# Patient Record
Sex: FEMALE | Race: BLACK OR AFRICAN AMERICAN | Employment: UNEMPLOYED | URBAN - METROPOLITAN AREA
[De-identification: names, ages, dates, MRNs, and addresses within clinical notes are randomized per-mention and may not be internally consistent; named-entity substitution may affect disease eponyms.]

---

## 2021-11-30 ENCOUNTER — APPOINTMENT (OUTPATIENT)
Dept: GENERAL RADIOLOGY | Age: 26
DRG: 198 | End: 2021-11-30
Payer: MEDICAID

## 2021-11-30 ENCOUNTER — HOSPITAL ENCOUNTER (INPATIENT)
Age: 26
LOS: 2 days | Discharge: HOME | DRG: 198 | End: 2021-12-03
Attending: EMERGENCY MEDICINE | Admitting: INTERNAL MEDICINE
Payer: MEDICAID

## 2021-11-30 DIAGNOSIS — R01.1 HEART MURMUR: ICD-10-CM

## 2021-11-30 DIAGNOSIS — Z95.0 PACEMAKER: ICD-10-CM

## 2021-11-30 DIAGNOSIS — R07.9 CHEST PAIN, UNSPECIFIED TYPE: Primary | ICD-10-CM

## 2021-11-30 LAB
ALBUMIN SERPL-MCNC: 4.5 GM/DL (ref 3.4–5)
ALP BLD-CCNC: 71 IU/L (ref 40–129)
ALT SERPL-CCNC: 15 U/L (ref 10–40)
ANION GAP SERPL CALCULATED.3IONS-SCNC: 13 MMOL/L (ref 4–16)
AST SERPL-CCNC: 19 IU/L (ref 15–37)
BASOPHILS ABSOLUTE: 0 K/CU MM
BASOPHILS RELATIVE PERCENT: 0.3 % (ref 0–1)
BILIRUB SERPL-MCNC: 0.4 MG/DL (ref 0–1)
BUN BLDV-MCNC: 13 MG/DL (ref 6–23)
CALCIUM SERPL-MCNC: 9.9 MG/DL (ref 8.3–10.6)
CHLORIDE BLD-SCNC: 105 MMOL/L (ref 99–110)
CO2: 22 MMOL/L (ref 21–32)
CREAT SERPL-MCNC: 0.6 MG/DL (ref 0.6–1.1)
DIFFERENTIAL TYPE: ABNORMAL
EOSINOPHILS ABSOLUTE: 0.1 K/CU MM
EOSINOPHILS RELATIVE PERCENT: 0.9 % (ref 0–3)
GFR AFRICAN AMERICAN: >60 ML/MIN/1.73M2
GFR NON-AFRICAN AMERICAN: >60 ML/MIN/1.73M2
GLUCOSE BLD-MCNC: 125 MG/DL (ref 70–99)
HCT VFR BLD CALC: 41.8 % (ref 37–47)
HEMOGLOBIN: 13.8 GM/DL (ref 12.5–16)
IMMATURE NEUTROPHIL %: 0.1 % (ref 0–0.43)
LYMPHOCYTES ABSOLUTE: 3.5 K/CU MM
LYMPHOCYTES RELATIVE PERCENT: 44 % (ref 24–44)
MCH RBC QN AUTO: 29.3 PG (ref 27–31)
MCHC RBC AUTO-ENTMCNC: 33 % (ref 32–36)
MCV RBC AUTO: 88.7 FL (ref 78–100)
MONOCYTES ABSOLUTE: 0.6 K/CU MM
MONOCYTES RELATIVE PERCENT: 7.7 % (ref 0–4)
NUCLEATED RBC %: 0 %
PDW BLD-RTO: 13.1 % (ref 11.7–14.9)
PLATELET # BLD: 215 K/CU MM (ref 140–440)
PMV BLD AUTO: 9.6 FL (ref 7.5–11.1)
POTASSIUM SERPL-SCNC: 3.9 MMOL/L (ref 3.5–5.1)
RBC # BLD: 4.71 M/CU MM (ref 4.2–5.4)
SEGMENTED NEUTROPHILS ABSOLUTE COUNT: 3.7 K/CU MM
SEGMENTED NEUTROPHILS RELATIVE PERCENT: 47 % (ref 36–66)
SODIUM BLD-SCNC: 140 MMOL/L (ref 135–145)
TOTAL IMMATURE NEUTOROPHIL: 0.01 K/CU MM
TOTAL NUCLEATED RBC: 0 K/CU MM
TOTAL PROTEIN: 8.1 GM/DL (ref 6.4–8.2)
TROPONIN T: <0.01 NG/ML
WBC # BLD: 7.8 K/CU MM (ref 4–10.5)

## 2021-11-30 PROCEDURE — 84484 ASSAY OF TROPONIN QUANT: CPT

## 2021-11-30 PROCEDURE — 71045 X-RAY EXAM CHEST 1 VIEW: CPT

## 2021-11-30 PROCEDURE — 80053 COMPREHEN METABOLIC PANEL: CPT

## 2021-11-30 PROCEDURE — 93005 ELECTROCARDIOGRAM TRACING: CPT | Performed by: EMERGENCY MEDICINE

## 2021-11-30 PROCEDURE — 83880 ASSAY OF NATRIURETIC PEPTIDE: CPT

## 2021-11-30 PROCEDURE — 84702 CHORIONIC GONADOTROPIN TEST: CPT

## 2021-11-30 PROCEDURE — 85025 COMPLETE CBC W/AUTO DIFF WBC: CPT

## 2021-11-30 PROCEDURE — 99283 EMERGENCY DEPT VISIT LOW MDM: CPT

## 2021-11-30 ASSESSMENT — PAIN DESCRIPTION - ORIENTATION: ORIENTATION: LEFT

## 2021-11-30 ASSESSMENT — PAIN DESCRIPTION - PAIN TYPE: TYPE: ACUTE PAIN

## 2021-11-30 ASSESSMENT — PAIN SCALES - GENERAL: PAINLEVEL_OUTOF10: 7

## 2021-11-30 ASSESSMENT — PAIN DESCRIPTION - LOCATION: LOCATION: CHEST;ARM;RIB CAGE

## 2021-12-01 PROBLEM — R07.9 CHEST PAIN: Status: ACTIVE | Noted: 2021-12-01

## 2021-12-01 LAB
GONADOTROPIN, CHORIONIC (HCG) QUANT: 0.5 UIU/ML
LACTIC ACID, SEPSIS: 0.9 MMOL/L (ref 0.5–1.9)
LV EF: 48 %
LVEF MODALITY: NORMAL
PRO-BNP: 256.5 PG/ML
REASON FOR REJECTION: NORMAL
REJECTED TEST: NORMAL
TROPONIN T: <0.01 NG/ML
TROPONIN T: <0.01 NG/ML

## 2021-12-01 PROCEDURE — 6360000002 HC RX W HCPCS: Performed by: INTERNAL MEDICINE

## 2021-12-01 PROCEDURE — 84484 ASSAY OF TROPONIN QUANT: CPT

## 2021-12-01 PROCEDURE — 83605 ASSAY OF LACTIC ACID: CPT

## 2021-12-01 PROCEDURE — 6360000002 HC RX W HCPCS: Performed by: EMERGENCY MEDICINE

## 2021-12-01 PROCEDURE — 2580000003 HC RX 258: Performed by: INTERNAL MEDICINE

## 2021-12-01 PROCEDURE — 87040 BLOOD CULTURE FOR BACTERIA: CPT

## 2021-12-01 PROCEDURE — 6370000000 HC RX 637 (ALT 250 FOR IP): Performed by: INTERNAL MEDICINE

## 2021-12-01 PROCEDURE — 36415 COLL VENOUS BLD VENIPUNCTURE: CPT

## 2021-12-01 PROCEDURE — 93306 TTE W/DOPPLER COMPLETE: CPT

## 2021-12-01 PROCEDURE — 99254 IP/OBS CNSLTJ NEW/EST MOD 60: CPT | Performed by: INTERNAL MEDICINE

## 2021-12-01 PROCEDURE — 1200000000 HC SEMI PRIVATE

## 2021-12-01 RX ORDER — ACETAMINOPHEN 650 MG/1
650 SUPPOSITORY RECTAL EVERY 6 HOURS PRN
Status: DISCONTINUED | OUTPATIENT
Start: 2021-12-01 | End: 2021-12-03 | Stop reason: HOSPADM

## 2021-12-01 RX ORDER — SODIUM CHLORIDE 9 MG/ML
25 INJECTION, SOLUTION INTRAVENOUS PRN
Status: DISCONTINUED | OUTPATIENT
Start: 2021-12-01 | End: 2021-12-03 | Stop reason: HOSPADM

## 2021-12-01 RX ORDER — ONDANSETRON 2 MG/ML
4 INJECTION INTRAMUSCULAR; INTRAVENOUS EVERY 6 HOURS PRN
Status: DISCONTINUED | OUTPATIENT
Start: 2021-12-01 | End: 2021-12-03 | Stop reason: HOSPADM

## 2021-12-01 RX ORDER — POLYETHYLENE GLYCOL 3350 17 G/17G
17 POWDER, FOR SOLUTION ORAL DAILY PRN
Status: DISCONTINUED | OUTPATIENT
Start: 2021-12-01 | End: 2021-12-03 | Stop reason: HOSPADM

## 2021-12-01 RX ORDER — SODIUM CHLORIDE 0.9 % (FLUSH) 0.9 %
5-40 SYRINGE (ML) INJECTION EVERY 12 HOURS SCHEDULED
Status: DISCONTINUED | OUTPATIENT
Start: 2021-12-01 | End: 2021-12-03 | Stop reason: HOSPADM

## 2021-12-01 RX ORDER — MORPHINE SULFATE 2 MG/ML
4 INJECTION, SOLUTION INTRAMUSCULAR; INTRAVENOUS ONCE
Status: COMPLETED | OUTPATIENT
Start: 2021-12-01 | End: 2021-12-01

## 2021-12-01 RX ORDER — ONDANSETRON 2 MG/ML
4 INJECTION INTRAMUSCULAR; INTRAVENOUS EVERY 6 HOURS PRN
Status: DISCONTINUED | OUTPATIENT
Start: 2021-12-01 | End: 2021-12-01 | Stop reason: SDUPTHER

## 2021-12-01 RX ORDER — FUROSEMIDE 20 MG/1
40 TABLET ORAL 2 TIMES DAILY
Status: DISCONTINUED | OUTPATIENT
Start: 2021-12-01 | End: 2021-12-01

## 2021-12-01 RX ORDER — ASPIRIN 81 MG/1
81 TABLET ORAL DAILY
Status: DISCONTINUED | OUTPATIENT
Start: 2021-12-01 | End: 2021-12-03

## 2021-12-01 RX ORDER — FUROSEMIDE 40 MG/1
40 TABLET ORAL 2 TIMES DAILY
Status: ON HOLD | COMMUNITY
End: 2021-12-03 | Stop reason: HOSPADM

## 2021-12-01 RX ORDER — ENALAPRIL MALEATE 10 MG/1
10 TABLET ORAL DAILY
Status: ON HOLD | COMMUNITY
End: 2021-12-03 | Stop reason: SDUPTHER

## 2021-12-01 RX ORDER — ONDANSETRON 4 MG/1
4 TABLET, ORALLY DISINTEGRATING ORAL EVERY 8 HOURS PRN
Status: DISCONTINUED | OUTPATIENT
Start: 2021-12-01 | End: 2021-12-03 | Stop reason: HOSPADM

## 2021-12-01 RX ORDER — SODIUM CHLORIDE 0.9 % (FLUSH) 0.9 %
5-40 SYRINGE (ML) INJECTION PRN
Status: DISCONTINUED | OUTPATIENT
Start: 2021-12-01 | End: 2021-12-03 | Stop reason: HOSPADM

## 2021-12-01 RX ORDER — METOPROLOL SUCCINATE 25 MG/1
25 TABLET, EXTENDED RELEASE ORAL DAILY
Status: DISCONTINUED | OUTPATIENT
Start: 2021-12-01 | End: 2021-12-03 | Stop reason: HOSPADM

## 2021-12-01 RX ORDER — ENALAPRIL MALEATE 5 MG/1
10 TABLET ORAL DAILY
Status: DISCONTINUED | OUTPATIENT
Start: 2021-12-01 | End: 2021-12-03 | Stop reason: HOSPADM

## 2021-12-01 RX ORDER — ASPIRIN 81 MG/1
81 TABLET ORAL DAILY
Status: ON HOLD | COMMUNITY
End: 2021-12-03 | Stop reason: HOSPADM

## 2021-12-01 RX ORDER — ACETAMINOPHEN 325 MG/1
650 TABLET ORAL EVERY 6 HOURS PRN
Status: DISCONTINUED | OUTPATIENT
Start: 2021-12-01 | End: 2021-12-03 | Stop reason: HOSPADM

## 2021-12-01 RX ADMIN — ENOXAPARIN SODIUM 40 MG: 100 INJECTION SUBCUTANEOUS at 12:52

## 2021-12-01 RX ADMIN — SODIUM CHLORIDE, PRESERVATIVE FREE 10 ML: 5 INJECTION INTRAVENOUS at 21:27

## 2021-12-01 RX ADMIN — FUROSEMIDE 40 MG: 20 TABLET ORAL at 12:48

## 2021-12-01 RX ADMIN — ASPIRIN 81 MG: 81 TABLET, COATED ORAL at 12:48

## 2021-12-01 RX ADMIN — ONDANSETRON 4 MG: 2 INJECTION INTRAMUSCULAR; INTRAVENOUS at 02:34

## 2021-12-01 RX ADMIN — METOPROLOL SUCCINATE 25 MG: 25 TABLET, EXTENDED RELEASE ORAL at 12:51

## 2021-12-01 RX ADMIN — SODIUM CHLORIDE, PRESERVATIVE FREE 10 ML: 5 INJECTION INTRAVENOUS at 12:51

## 2021-12-01 RX ADMIN — MORPHINE SULFATE 4 MG: 2 INJECTION, SOLUTION INTRAMUSCULAR; INTRAVENOUS at 02:34

## 2021-12-01 ASSESSMENT — PAIN SCALES - GENERAL: PAINLEVEL_OUTOF10: 7

## 2021-12-01 NOTE — ED PROVIDER NOTES
Emergency [address] EMERGENCY DEPARTMENT    Patient: Ariadna Orellana  MRN: [id number]  : 1995  Date of Evaluation: 2021  ED Provider: Flora Deras MD    Chief Complaint       Chief Complaint   Patient presents with    Chest Pain     pacemaker in place      Stefani Khan is a 27 y.o. female who presents to the emergency department with chest pain. Patient has a significant past medical history of a valve replacement and complete heart block with a pacemaker placement this was in [place]. The patient states that she occasionally gets chest wall cramps but not normally severe pain. Today she had an episode of pain that was similar to an episode of pain that she had in April of this year that required admission to the hospital for IV antibiotics. That admission was in [facility]. Patient's had no fevers, chills, nausea or vomiting. She is unsure if anything makes the pain better or worse but it is pain that she has had in the past. She has been compliant with all of her medications. No shortness of breath. No sick contacts. ROS:     At least 10 systems reviewed and otherwise acutely negative except as in the [address] Past History   History reviewed. No pertinent past medical history. History reviewed. No pertinent surgical history.   Social History     Socioeconomic History    Marital status: Single     Spouse name: None    Number of children: None    Years of education: None    Highest education level: None   Occupational History    None   Tobacco Use    Smoking status: None    Smokeless tobacco: None   Substance and Sexual Activity    Alcohol use: None    Drug use: None    Sexual activity: None   Other Topics Concern    None   Social History Narrative    None     Social Determinants of Health     Financial Resource Strain:     Difficulty of Paying Living Expenses: Not on file   Food Insecurity:     Worried About Running Out of Food in the Last Year: Not on file    Ran Out of Food in the Last Year: Not on file   Transportation Needs:     Lack of Transportation (Medical): Not on file    Lack of Transportation (Non-Medical): Not on file   Physical Activity:     Days of Exercise per Week: Not on file    Minutes of Exercise per Session: Not on file   Stress:     Feeling of Stress : Not on file   Social Connections:     Frequency of Communication with Friends and Family: Not on file    Frequency of Social Gatherings with Friends and Family: Not on file    Attends Taoist Services: Not on file    Active Member of [address] [place] [address] [employer] or Organizations: Not on file    Attends Club or Organization Meetings: Not on file    Marital Status: Not on file   Intimate Partner Violence:     Fear of Current or Ex-Partner: Not on file    Emotionally Abused: Not on file    Physically Abused: Not on file    Sexually Abused: Not on file   Housing Stability:     Unable to Pay for Housing in the Last Year: Not on file    Number of [place] in the Last Year: Not on file    Unstable Housing in the Last Year: Not on file       Medications/Allergies     Previous Medications    No medications on file     No Known Allergies     Physical Exam       ED Triage Vitals [11/30/21 2227]   BP Temp Temp src Pulse Resp SpO2 Height Weight   138/84 98.2 °F (36.8 °C) -- 70 18 99 % -- --     GENERAL APPEARANCE: Awake and alert. Cooperative. No acute distress. HEAD: Normocephalic. Atraumatic. EYES: Sclera anicteric. ENT: Tolerates saliva. No trismus. NECK: Supple. Trachea midline. CARDIO: RRR. Radial pulse 2+. Systolic ejection murmur  LUNGS: Respirations unlabored. CTAB. ABDOMEN: Soft. Non-distended. Non-tender. EXTREMITIES: No acute deformities. SKIN: Warm and dry. NEUROLOGICAL: No gross facial drooping. Moves all 4 extremities spontaneously. PSYCHIATRIC: Normal mood.      Diagnostics   Labs:  Results for orders placed or performed during the hospital encounter of 11/30/21   CBC Auto Differential   Result Value Ref Range    WBC 7.8 4.0 - 10.5 K/CU MM    RBC 4.71 4.2 - 5.4 M/CU MM    Hemoglobin 13.8 12.5 - 16.0 GM/DL    Hematocrit 41.8 37 - 47 %    MCV 88.7 78 - 100 FL    MCH 29.3 27 - 31 PG    MCHC 33.0 32.0 - 36.0 %    RDW 13.1 11.7 - 14.9 %    Platelets 722 238 - 428 K/CU MM    MPV 9.6 7.5 - 11.1 FL    Differential Type AUTOMATED DIFFERENTIAL     Segs Relative 47.0 36 - 66 %    Lymphocytes % 44.0 24 - 44 %    Monocytes % 7.7 (H) 0 - 4 %    Eosinophils % 0.9 0 - 3 %    Basophils % 0.3 0 - 1 %    Segs Absolute 3.7 K/CU MM    Lymphocytes Absolute 3.5 K/CU MM    Monocytes Absolute 0.6 K/CU MM    Eosinophils Absolute 0.1 K/CU MM    Basophils Absolute 0.0 K/CU MM    Nucleated RBC % 0.0 %    Total Nucleated RBC 0.0 K/CU MM    Total Immature Neutrophil 0.01 K/CU MM    Immature Neutrophil % 0.1 0 - 0.43 %   Comprehensive Metabolic Panel w/ Reflex to MG   Result Value Ref Range    Sodium 140 135 - 145 MMOL/L    Potassium 3.9 3.5 - 5.1 MMOL/L    Chloride 105 99 - 110 mMol/L    CO2 22 21 - 32 MMOL/L    BUN 13 6 - 23 MG/DL    CREATININE 0.6 0.6 - 1.1 MG/DL    Glucose 125 (H) 70 - 99 MG/DL    Calcium 9.9 8.3 - 10.6 MG/DL    Albumin 4.5 3.4 - 5.0 GM/DL    Total Protein 8.1 6.4 - 8.2 GM/DL    Total Bilirubin 0.4 0.0 - 1.0 MG/DL    ALT 15 10 - 40 U/L    AST 19 15 - 37 IU/L    Alkaline Phosphatase 71 40 - 129 IU/L    GFR Non-African American >60 >60 mL/min/1.73m2    GFR African American >60 >60 mL/min/1.73m2    Anion Gap 13 4 - 16   Troponin   Result Value Ref Range    Troponin T <0.010 <0.01 NG/ML   Brain Natriuretic Peptide   Result Value Ref Range    Pro-.5 <300 PG/ML   HCG Serum, Quantitative   Result Value Ref Range    hCG Quant 0.5 UIU/ML     Radiographs:  XR CHEST PORTABLE    Result Date: 11/30/2021  EXAMINATION: ONE XRAY VIEW OF THE CHEST 11/30/2021 10:40 pm COMPARISON: None.  HISTORY: ORDERING SYSTEM PROVIDED HISTORY: chest pain Acuity: Acute Type of Exam: Initial FINDINGS: Sequela from prior open heart surgery and AICD placement. Mild cardiomegaly. The mediastinal and hilar silhouettes appear otherwise unremarkable. The lungs appear clear. No pleural effusion evident. No pneumothorax is seen. No acute osseous abnormality is identified. No radiographic evidence of acute pulmonary disease. Cardiomegaly. Procedures/EK lead EKG per my interpretation:  Paced 70  Axis is   Left axis deviation  QTc is  within an acceptable range  There is specific T wave changes appreciated. There is specific ST wave changes appreciated. LBBB pattern  Prior EKG to compare with was not available       ED Course and MDM   In brief, Clark Tran is a 27 y.o. female who presented to the emergency department with chest pain and a significant cardiac history including previous cardiac surgery and a pacemaker. None of the patient's records are available at this hospital. Patient's ECG demonstrates a paced rhythm without ischemic changes at this time. Patient's laboratory evaluations are reassuring. Given her reported history of possible infection of the pacemaker lactic and blood cultures were added on. Patient was provided with pain and nausea medications. An echo was ordered and hospitalist was consulted for admission for high risk chest pain rule out. Patient was in agreement with this at the time of the admission. ED Medication Orders (From admission, onward)    Start Ordered     Status Ordering Provider    21 0045 21 003  morphine (PF) injection 4 mg  ONCE         Acknowledged AMISH ROBLES    21 0030 21 0030  ondansetron (ZOFRAN) injection 4 mg  EVERY 6 HOURS PRN         Acknowledged AMISH ROBLES          Final Impression      1. Chest pain, unspecified type    2. Pacemaker    3.  Heart murmur      DISPOSITION Decision To Admit 2021 12:59:29 AM     (Please note that portions of this note may have been completed with a voice recognition program. Efforts were made to edit the dictations but occasionally words are mis-transcribed.)    Everett Santiago MD  [id number] Crabtree MD  12/01/21 Andie Nugent MD  12/01/21 9419

## 2021-12-01 NOTE — ED NOTES
Report given to Mario Leiva RN. Care of pt transferred at this time.       Gaby Castellano RN  12/01/21 6310

## 2021-12-01 NOTE — ED NOTES
Patients [facility] in [facility] and Aida Thornton ,[address] [place] , [facility]  12/01/21 1655

## 2021-12-01 NOTE — ED NOTES
1208 perfect serve message sent to Dr Karis Ivey on in pt consult from hospitalist.      Charles Larsen  12/01/21 1210  1209 Dr Karis Ivey acknowledged perfect serve message.  Added to treatment team      Charles Larsen  12/01/21 1211

## 2021-12-01 NOTE — CONSULTS
INPATIENT CARDIOLOGY CONSULT NOTE       Reason for consultation:  CP    Referring physician:  Jose Hazel MD       Dear Jose Hazel MD Thank you for the consult    Chief Complaint   Patient presents with    Chest Pain     pacemaker in place        History of present illness:Hang is a 27 y. o.year old who  presents with  Chief Complaint   Patient presents with    Chest Pain     pacemaker in place        Patient is a 78-year-old -American female, who does not speak [place], currently residing in [facility] with her cousins presents with chest pain. Patient has prior history of aortic valve replacement in [place] as well as an AICD placement. Is unclear what led to above procedures. Patient does not recall the hospital in [place]. Chest pain is left anterior chest, across, nonradiating nonexertional denies any palpitation or shortness of breath    EKG shows atrial sensed ventricular paced rhythm  Serial cardiac troponins are negative        Past medical history:    has no past medical history on file. Past surgical history:   has no past surgical history on file.   Social History:     Family history:   no family history of CAD, STROKE of DM    No Known Allergies    sodium chloride flush 0.9 % injection 5-40 mL, 2 times per day  sodium chloride flush 0.9 % injection 5-40 mL, PRN  0.9 % sodium chloride infusion, PRN  enoxaparin (LOVENOX) injection 40 mg, Daily  ondansetron (ZOFRAN-ODT) disintegrating tablet 4 mg, Q8H PRN   Or  ondansetron (ZOFRAN) injection 4 mg, Q6H PRN  polyethylene glycol (GLYCOLAX) packet 17 g, Daily PRN  acetaminophen (TYLENOL) tablet 650 mg, Q6H PRN   Or  acetaminophen (TYLENOL) suppository 650 mg, Q6H PRN  aspirin EC tablet 81 mg, Daily  enalapril (VASOTEC) tablet 10 mg, Daily  furosemide (LASIX) tablet 40 mg, BID  metoprolol succinate (TOPROL XL) extended release tablet 25 mg, Daily      Current Facility-Administered Medications   Medication Dose Route Frequency Provider Last Rate Last Admin    sodium chloride flush 0.9 % injection 5-40 mL  5-40 mL IntraVENous 2 times per day Tyrese Mccarthy MD   10 mL at 12/01/21 1251    sodium chloride flush 0.9 % injection 5-40 mL  5-40 mL IntraVENous PRN Tyrese Mccarthy MD        0.9 % sodium chloride infusion  25 mL IntraVENous PRN Tyrese Mccarthy MD        enoxaparin (LOVENOX) injection 40 mg  40 mg SubCUTAneous Daily Tyrese Mccarthy MD   40 mg at 12/01/21 1252    ondansetron (ZOFRAN-ODT) disintegrating tablet 4 mg  4 mg Oral Q8H PRN Tyrese Mccarthy MD        Or    ondansetron (ZOFRAN) injection 4 mg  4 mg IntraVENous Q6H PRN Tyrese Mccarthy MD        polyethylene glycol (GLYCOLAX) packet 17 g  17 g Oral Daily PRN Tyrese Mccarthy MD        acetaminophen (TYLENOL) tablet 650 mg  650 mg Oral Q6H PRN Tyrese Mccarthy MD        Or   [place] acetaminophen (TYLENOL) suppository 650 mg  650 mg Rectal Q6H PRN Tyrese Mccarthy MD        aspirin EC tablet 81 mg  81 mg Oral Daily Tyrese Mccarthy MD   81 mg at 12/01/21 1248    enalapril (VASOTEC) tablet 10 mg  10 mg Oral Daily Tyrese Mccarthy MD        furosemide (LASIX) tablet 40 mg  40 mg Oral BID Tyrese Mccarthy MD   40 mg at 12/01/21 1248    metoprolol succinate (TOPROL XL) extended release tablet 25 mg  25 mg Oral Daily Tyrese Mccarthy MD   25 mg at 12/01/21 1251     Current Outpatient Medications   Medication Sig Dispense Refill    aspirin 81 MG EC tablet Take 81 mg by mouth daily      enalapril (VASOTEC) 10 MG tablet Take 10 mg by mouth daily      Metoprolol Succinate 25 MG CS24 Take 1 tablet by mouth daily      furosemide (LASIX) 40 MG tablet Take 40 mg by mouth 2 times daily           Review of Systems:     Limited review of systems due to language barrier. In short patient complains of anterior chest pain denies any shortness of breath or palpitations.   Patient also complains of epigastric and abdominal pain.    Physical Examination:      Vitals:    21 1230   BP: 109/83   Pulse: 70   Resp: 16   Temp: 98.2 °F (36.8 °C)   SpO2:       Wt Readings from Last 3 Encounters:   No data found for Wt     There is no height or weight on file to calculate BMI. General Appearance:  No distress, conversant  Constitutional:  Well developed, Well nourished  HEENT:  Normocephalic, Atraumatic, Oropharynx moist, No oral exudates,   Nose normal. Neck Supple Carotid: no carotid bruit  Eyes:  Conjunctiva normal, No discharge. Respiratory:    Normal breath sounds, No respiratory distress, No wheezing, no use of accessory muscles, diaphragm movement is normal  No chest Tenderness  Cardiovascular: S1-S2 No murmurs auscultated. No rubs, thrills or gallops. Normal  rhythm. Pedal pulses are normal. No pedal edema  GI:  Soft Non tender, non distended. :  No CVA tenderness. Musculoskeletal:   No tenderness, No cyanosis, No clubbing. Integument:  Warm, Dry, No erythema, No rash. Lymphatic:  No lymphadenopathy noted. Neurologic:  Alert & oriented x 3  No focal deficits noted. Psychiatric:  Affect normal, Judgment normal, Mood normal.       Lab Review     Recent Labs     21  2150   WBC 7.8   HGB 13.8   HCT 41.8         Recent Labs     21  2150      K 3.9      CO2 22   BUN 13   CREATININE 0.6     Recent Labs     21  2150   AST 19   ALT 15   BILITOT 0.4   ALKPHOS 71     No results for input(s): TROPONINI in the last 72 hours. No results found for: BNP  No results found for: INR, PROTIME      All labs, images, EKGs were personally reviewed      Assessment: 27 y. o.year old       Recommendations:    Echo 2021     Left ventricular systolic function is abnormal.   Ejection fraction is visually estimated at 45-50%. The left ventricle appears dilated. Mild left ventricular hypertrophy. PPM wiring visualized within the right ventricle.    Prosthetic aortic valve (18); max P mmHg, mean P mmHg. Tethered appearance of anterior mitral valve leaflet. Severe posteriorly directed mitral regurgitation. No evidence of any pericardial effusion. 1. Noncardiac chest pain : Reproducible on touch. No ischemic work-up planned. Patient also has epigastric discomfort and tenderness. EKG shows atrial sensed ventricular paced rhythm. Cardiac troponins are negative. No ischemia noted otherwise. 2. History of aortic valve replacement, bioprosthetic -recommend aspirin. 3. Severe posteriorly directed mitral regurgitation with tethering of anterior mitral leaflet ? Postop - will obtain surgical evaluation . .   4. S/p AICD  ? Indication  . .. We will obtain medical records if possible.   Interrogate device       Thank you for the consult    Dr. Juan Francisco Ramirez  2021 4:33 PM

## 2021-12-01 NOTE — ED NOTES
Bed: ED-37  Expected date:   Expected time:   Means of arrival:   Comments:  [id number] Ender Lam RN  12/01/21 7000

## 2021-12-01 NOTE — H&P
HISTORY AND PHYSICAL  (Hospitalist, Internal Medicine)  IDENTIFYING INFORMATION   PATIENT:  Katherine Herrera  MRN:  [id number]  ADMIT DATE: 11/30/2021      CHIEF COMPLAINT   Chest pain    HISTORY OF PRESENT ILLNESS   Katherine Herrera is a 27 y.o. female with valve replacement (details unknown) s/p AICD presented to ED with complaints of chest pain since 8 PM.  Patient reported that since she had the valve replacement in 2018 she has intermittent chest pain, but it was worse today. Reported 7/10 intensity, burning, pleuritic pain on the left side of the chest going into the scapula. Denied any aggravating or relieving factors. Patient denied any fever, chills, but admitted to having nausea, shortness of breath. Denied any abdominal pain, denied any urinary complaints, denied any constipation or diarrhea. Patient reports that she had valve replacement in 11/21/2018 and then AICD placement 11/30/2018 in [place]. Patient reports that she was following with cardiologist in [facility] until July 2021 and moved to [address] [place] [address],[address] in August 2021. Patient speaks [place]. Could not get  services. Discussed with patient's relative to get information. Patient does not know the reason that she got the valve replacement, which valve. She also reported to ER physician that she got similar kind of chest pain 4/2021 and was treated with antibiotics. But she could not recall the information when I asked. Patient's history is confusing. Patient does not even remember which hospital she was in [place]. Patient's relative reported that they had some paperwork which I requested to be brought to the hospital.  Patient received morphine in ER. Patient's pain is down to 3/10 in intensity.     PAST MEDICAL HISTORY PAST SURGICAL HISTORY   valve replacement (details unknown) s/p AICD   valve replacement, AICD placement   FAMILY HISTORY dry.   NEURO  - Awake, alert, oriented x 3, no focal deficits. LABS AND IMAGING     Results for Sera Graham (MRN [id number]) as of 12/1/2021 06:38   Ref.  Range 11/30/2021 21:50 12/1/2021 03:20   Sodium Latest Ref Range: 135 - 145 MMOL/L 140    Potassium Latest Ref Range: 3.5 - 5.1 MMOL/L 3.9    Chloride Latest Ref Range: 99 - 110 mMol/L 105    CO2 Latest Ref Range: 21 - 32 MMOL/L 22    BUN Latest Ref Range: 6 - 23 MG/DL 13    Creatinine Latest Ref Range: 0.6 - 1.1 MG/DL 0.6    Anion Gap Latest Ref Range: 4 - 16  13    GFR Non- Latest Ref Range: >60 mL/min/1.73m2 >60    GFR African American Latest Ref Range: >60 mL/min/1.73m2 >60    Glucose Latest Ref Range: 70 - 99 MG/ (H)    Calcium Latest Ref Range: 8.3 - 10.6 MG/DL 9.9    Total Protein Latest Ref Range: 6.4 - 8.2 GM/DL 8.1    Pro-BNP Latest Ref Range: <300 PG/.5    Troponin T Latest Ref Range: <0.01 NG/ML <0.010    Albumin Latest Ref Range: 3.4 - 5.0 GM/DL 4.5    Alk Phos Latest Ref Range: 40 - 129 IU/L 71    ALT Latest Ref Range: 10 - 40 U/L 15    AST Latest Ref Range: 15 - 37 IU/L 19    Bilirubin Latest Ref Range: 0.0 - 1.0 MG/DL 0.4    hCG Quant Latest Units: UIU/ML 0.5    WBC Latest Ref Range: 4.0 - 10.5 K/CU MM 7.8    RBC Latest Ref Range: 4.2 - 5.4 M/CU MM 4.71    Hemoglobin Quant Latest Ref Range: 12.5 - 16.0 GM/DL 13.8    Hematocrit Latest Ref Range: 37 - 47 % 41.8    MCV Latest Ref Range: 78 - 100 FL 88.7    MCH Latest Ref Range: 27 - 31 PG 29.3    MCHC Latest Ref Range: 32.0 - 36.0 % 33.0    MPV Latest Ref Range: 7.5 - 11.1 FL 9.6    RDW Latest Ref Range: 11.7 - 14.9 % 13.1    Platelet Count Latest Ref Range: 140 - 440 K/CU     Lymphocyte % Latest Ref Range: 24 - 44 % 44.0    Monocytes % Latest Ref Range: 0 - 4 % 7.7 (H)    Eosinophils % Latest Ref Range: 0 - 3 % 0.9    Basophils % Latest Ref Range: 0 - 1 % 0.3    Lymphocytes Absolute Latest Units: K/CU MM 3.5    Monocytes Absolute Latest Units: K/CU MM 0.6 Eosinophils Absolute Latest Units: K/CU MM 0.1    Basophils Absolute Latest Units: K/CU MM 0.0    Differential Type Unknown AUTOMATED DIFFERENTIAL    Segs Relative Latest Ref Range: 36 - 66 % 47.0    Segs Absolute Latest Units: K/CU MM 3.7    Nucleated RBC % Latest Units: % 0.0    Immature Neutrophil % Latest Ref Range: 0 - 0.43 % 0.1    Total Immature Neutrophil Latest Units: K/CU MM 0.01    Total Nucleated RBC Latest Units: K/CU MM 0.0    Reason for Rejection Unknown  UNABLE TO PERFORM TESTING:     Recent Imaging    XR CHEST PORTABLE [[id number]] Collected: 11/30/21 2258      Order Status: Completed Updated: 11/30/21 2301     Narrative:       EXAMINATION:   ONE XRAY VIEW OF THE CHEST     11/30/2021 10:40 pm     COMPARISON:   None. HISTORY:   ORDERING SYSTEM PROVIDED HISTORY: chest pain     Acuity: Acute   Type of Exam: Initial     FINDINGS:   Sequela from prior open heart surgery and AICD placement.  Mild cardiomegaly. The mediastinal and hilar silhouettes appear otherwise unremarkable.  The   lungs appear clear. No pleural effusion evident. No pneumothorax is seen. No   acute osseous abnormality is identified.      Impression:       No radiographic evidence of acute pulmonary disease. Cardiomegaly.          Relevant labs and imaging reviewed    ASSESSMENT AND PLAN     #. Chest pain: Evaluate for acute coronary syndrome  -Troponin x1 - negative, EKG with no acute ST-T wave changes  -Serial troponins, repeat EKG  -Consult cardiology  -Get medical records from prior hospitalization. #.  Valve replacement  -Details unknown    #. Possible CAD/CHF  -Patient is on aspirin, enalapril, metoprolol succinate, Lasix. DVT Prophylaxis: Lovenox  GI Prophylaxis: Not indicated   code Status: FULL.       Case d/w ED physician    Tyrese Mccarthy MD  Hospitalist, Internal Medicine  12/1/2021 at 2:27 AM

## 2021-12-01 NOTE — PROGRESS NOTES
Brief progress note    Admission H&P done this morning reviewed. Agree with the plan. Patient with history of bioprosthetic aortic valve/AICD implantation was admitted for chest pain. Patient appears to be atypical at this time. Cardiology evaluation appreciated. Echocardiogram shows EF low normal at 45 to 50%. Also revealed severe mitral regurgitation. Cardiothoracic surgery consulted  Awaiting pacer interrogation  Patient was seen and examined at bedside. Denies any chest pain at this time.

## 2021-12-02 LAB
EKG ATRIAL RATE: 208 BPM
EKG ATRIAL RATE: 288 BPM
EKG ATRIAL RATE: 70 BPM
EKG ATRIAL RATE: 70 BPM
EKG ATRIAL RATE: 72 BPM
EKG DIAGNOSIS: NORMAL
EKG P AXIS: 47 DEGREES
EKG P AXIS: 95 DEGREES
EKG P-R INTERVAL: 360 MS
EKG P-R INTERVAL: 362 MS
EKG Q-T INTERVAL: 424 MS
EKG Q-T INTERVAL: 434 MS
EKG Q-T INTERVAL: 438 MS
EKG Q-T INTERVAL: 438 MS
EKG Q-T INTERVAL: 440 MS
EKG QRS DURATION: 136 MS
EKG QRS DURATION: 136 MS
EKG QRS DURATION: 140 MS
EKG QRS DURATION: 142 MS
EKG QRS DURATION: 144 MS
EKG QTC CALCULATION (BAZETT): 457 MS
EKG QTC CALCULATION (BAZETT): 468 MS
EKG QTC CALCULATION (BAZETT): 473 MS
EKG QTC CALCULATION (BAZETT): 473 MS
EKG QTC CALCULATION (BAZETT): 475 MS
EKG R AXIS: 259 DEGREES
EKG R AXIS: 261 DEGREES
EKG R AXIS: 261 DEGREES
EKG R AXIS: 263 DEGREES
EKG R AXIS: 263 DEGREES
EKG T AXIS: 77 DEGREES
EKG T AXIS: 78 DEGREES
EKG T AXIS: 80 DEGREES
EKG T AXIS: 83 DEGREES
EKG T AXIS: 87 DEGREES
EKG VENTRICULAR RATE: 70 BPM

## 2021-12-02 PROCEDURE — 94761 N-INVAS EAR/PLS OXIMETRY MLT: CPT

## 2021-12-02 PROCEDURE — 99254 IP/OBS CNSLTJ NEW/EST MOD 60: CPT | Performed by: INTERNAL MEDICINE

## 2021-12-02 PROCEDURE — 93010 ELECTROCARDIOGRAM REPORT: CPT | Performed by: INTERNAL MEDICINE

## 2021-12-02 PROCEDURE — 1200000000 HC SEMI PRIVATE

## 2021-12-02 PROCEDURE — 93005 ELECTROCARDIOGRAM TRACING: CPT | Performed by: NURSE PRACTITIONER

## 2021-12-02 PROCEDURE — 6370000000 HC RX 637 (ALT 250 FOR IP): Performed by: INTERNAL MEDICINE

## 2021-12-02 PROCEDURE — 99233 SBSQ HOSP IP/OBS HIGH 50: CPT | Performed by: INTERNAL MEDICINE

## 2021-12-02 PROCEDURE — APPSS60 APP SPLIT SHARED TIME 46-60 MINUTES: Performed by: NURSE PRACTITIONER

## 2021-12-02 PROCEDURE — 2580000003 HC RX 258: Performed by: INTERNAL MEDICINE

## 2021-12-02 PROCEDURE — 6360000002 HC RX W HCPCS: Performed by: INTERNAL MEDICINE

## 2021-12-02 RX ADMIN — ASPIRIN 81 MG: 81 TABLET, COATED ORAL at 08:29

## 2021-12-02 RX ADMIN — ENALAPRIL MALEATE 10 MG: 5 TABLET ORAL at 08:29

## 2021-12-02 RX ADMIN — SODIUM CHLORIDE, PRESERVATIVE FREE 10 ML: 5 INJECTION INTRAVENOUS at 08:30

## 2021-12-02 RX ADMIN — ENOXAPARIN SODIUM 40 MG: 100 INJECTION SUBCUTANEOUS at 08:30

## 2021-12-02 RX ADMIN — SODIUM CHLORIDE, PRESERVATIVE FREE 10 ML: 5 INJECTION INTRAVENOUS at 22:28

## 2021-12-02 ASSESSMENT — PAIN SCALES - GENERAL
PAINLEVEL_OUTOF10: 0

## 2021-12-02 ASSESSMENT — ENCOUNTER SYMPTOMS: SHORTNESS OF BREATH: 0

## 2021-12-02 NOTE — CONSULTS
Department of Cardiovascular & Thoracic Surgery   Consult Note        Reason for Consult: Severe mitral regurgitation  Requesting Physician:  Caesar Cadena MD        Date of Consult: 12/02/21    Chief Complaint: Shortness of breath    History Obtained From:  patient, electronic medical record    HISTORY OF PRESENT ILLNESS:    The patient is a 27 y.o. female who presents with Chest pain along with right sided arm tingling. The patient is originally from [facility] and is [place] speaking. We performed our interview with the  present. She has an extensive cardiac history which includes an aortic valve replacement with root enlargement along with mitral valve repair and left atrial appendage excision performed at [facility] and [facility] in [facility] in 2018. She subsequently required placement of biventricular pacemaker for persistent atrial fibrillation. She spent 11 days in the hospital postoperatively and pacemaker was placed 2 days prior to her discharge. In discussions with the patient, it seems like she was brought to [place] for her heart surgery through a specific program.  The plan was that she was supposed to go back to [facility] all her medical care afterwards. She did not return to [facility] due to the unstable situation. She is now moved here and states she is going to stay here for the indefinite future. She has had a similar episode to this in April. She denies lower extremity edema or heart failure symptoms. She has not seen any physicians here in the area. Past Medical History:    History reviewed. No pertinent past medical history. Past Surgical History:    History reviewed. No pertinent surgical history.   Current Medications:   Current Facility-Administered Medications: sodium chloride flush 0.9 % injection 5-40 mL, 5-40 mL, IntraVENous, 2 times per day  sodium chloride flush 0.9 % injection 5-40 mL, 5-40 mL, IntraVENous, PRN  0.9 % sodium chloride infusion, 25 mL, IntraVENous, PRN  enoxaparin (LOVENOX) injection 40 mg, 40 mg, SubCUTAneous, Daily  ondansetron (ZOFRAN-ODT) disintegrating tablet 4 mg, 4 mg, Oral, Q8H PRN **OR** ondansetron (ZOFRAN) injection 4 mg, 4 mg, IntraVENous, Q6H PRN  polyethylene glycol (GLYCOLAX) packet 17 g, 17 g, Oral, Daily PRN  acetaminophen (TYLENOL) tablet 650 mg, 650 mg, Oral, Q6H PRN **OR** acetaminophen (TYLENOL) suppository 650 mg, 650 mg, Rectal, Q6H PRN  aspirin EC tablet 81 mg, 81 mg, Oral, Daily  enalapril (VASOTEC) tablet 10 mg, 10 mg, Oral, Daily  metoprolol succinate (TOPROL XL) extended release tablet 25 mg, 25 mg, Oral, Daily  Allergies:  Patient has no known allergies. Social History:   Social History     Socioeconomic History    Marital status: Single     Spouse name: Not on file    Number of children: Not on file    Years of education: Not on file    Highest education level: Not on file   Occupational History    Not on file   Tobacco Use    Smoking status: Not on file    Smokeless tobacco: Not on file   Substance and Sexual Activity    Alcohol use: Not on file    Drug use: Not on file    Sexual activity: Not on file   Other Topics Concern    Not on file   Social History Narrative    Not on file     Social Determinants of Health     Financial Resource Strain:     Difficulty of Paying Living Expenses: Not on file   Food Insecurity:     Worried About Running Out of Food in the Last Year: Not on file    Leanne of Food in the Last Year: Not on file   Transportation Needs:     Lack of Transportation (Medical): Not on file    Lack of Transportation (Non-Medical):  Not on file   Physical Activity:     Days of Exercise per Week: Not on file    Minutes of Exercise per Session: Not on file   Stress:     Feeling of Stress : Not on file   Social Connections:     Frequency of Communication with Friends and Family: Not on file    Frequency of Social Gatherings with Friends and Family: Not on file    Attends Latter day Services: Not on file    Active Member of Clubs or Organizations: Not on file    Attends Club or Organization Meetings: Not on file    Marital Status: Not on file   Intimate Partner Violence:     Fear of Current or Ex-Partner: Not on file    Emotionally Abused: Not on file    Physically Abused: Not on file    Sexually Abused: Not on file   Housing Stability:     Unable to Pay for Housing in the Last Year: Not on file    Number of [place] in the Last Year: Not on file    Unstable Housing in the Last Year: Not on file     Family History:  History reviewed. No pertinent family history. REVIEW OF SYSTEMS:    CONSTITUTIONAL:  Neg. EYES:  Neg. EARS:  Neg     NOSE:  Neg.    MOUTH/THROAT:  Neg.    RESPIRATORY:   Shortness of breath  CARDIOVASCULAR   previous heart surgery  GASTROINTESTINAL:  Neg. GENITOURINARY:  Neg.    HEMATOLOGIC/LYMPHATIC:  Neg.    MUSCULOSKELETAL: Diffuse pain especially bilateral lower extremities  NEUROLOGICAL:  Neg. SKIN :  Neg. PSYCHIATRIC:  Neg   ENDOCRINE:  Neg. ALL/IMM :  Neg. PHYSICAL EXAM:  VITALS:  /72   Pulse 70   Temp 98.6 °F (37 °C) (Oral)   Resp 17   LMP 11/07/2021   SpO2 99%   CONSTITUTIONAL:  awake, alert, cooperative, no apparent distress, and appears stated age  NECK:  Supple, symmetrical, trachea midline, no adenopathy, thyroid symmetric, not enlarged and no tenderness, skin normal  HEMATOLOGIC/LYMPHATICS:  no cervical lymphadenopathy and no supraclavicular lymphadenopathy  LUNGS:  No increased work of breathing, good air exchange, clear to auscultation bilaterally, no crackles or wheezing  CARDIOVASCULAR:  Normal apical impulse, regular rate and rhythm, normal S1 and S2, no S3 or S4, and murmur noted  CHEST/BREASTS:  symmetric, healed sternal incision  ABDOMEN: soft nt nd, no pulsitile masses, scaphoid  MUSCULOSKELETAL:  There is no redness, warmth, or swelling of the joints. Full range of motion noted.   Motor strength is 5 out of 5 all extremities bilaterally. Tone is normal.  NEUROLOGIC:  Awake, alert, oriented to name, place and time. Cranial nerves II-XII are grossly intact. Motor is 5 out of 5 bilaterally. SKIN:  no bruising or bleeding and normal skin color, texture, turgor  VASC: 1+ femoral pulses  No lower extremity edema      DATA:  Echocardiogram reviewed in detail, patient has a mitral valve repair with tethered leaflets secondary to rheumatic disease. She has a posteriorly directed severe jet. Aortic valve prosthesis is normal.  This is a 23 trifecta valve. LV function has been felt to be 45 to 50% visually    [facility] Problems    Diagnosis Date Noted    Chest pain [R07.9] 12/01/2021       Severe mitral regurgitation status post mitral valve repair      RECOMMENDATIONS:    At this point I would recommend continued medical management. She needs to establish care here in the area as she is planning to stay in the area. There may be a financial concern with this. I have asked her to address this with the hospital . I will contact cardiology as she may benefit from CRT therapy as her QRS complex on EKG is 136 ms. she already has an LV lead in place. At this point, we would not proceed with any reoperation. She has a tissue aortic valve prosthesis in place. I would recommend optimal medical management and continued close follow-up. This bioprosthetic aortic valve will fail at some point at that point we would discuss proceeding with redo sternotomy with redo aortic valve replacement along with redo mitral valve intervention with plan to replace the mitral valve. We have discussed this with the patient. She states that the team in [place] told her something similar and that she has had this regurgitation for some time now. I would like her to see me in the office in 1 to 2 months.     Electronically signed by Trini Bach MD on 12/2/2021 at 1:15 PM

## 2021-12-02 NOTE — PROGRESS NOTES
Cardiology Progress Note     Today's Plan: interrogate device    Admit Date:  11/30/2021    Consult reason/ Seen today for: Chest pain     Subjective and  Overnight Events: Virtual  used- Reports chest pain resolved not currently having SOB. Assessment / Plan / Recommendation:     1. Noncardiac chest pain : Reproducible on touch. No ischemic work-up planned. Patient also has epigastric discomfort and tenderness. EKG shows atrial sensed ventricular paced rhythm. Cardiac troponins are negative. No ischemia noted otherwise. 2. History of aortic valve replacement, bioprosthetic in 2018-recommend aspirin. 3. Severe posteriorly directed mitral regurgitation with tethering of anterior mitral leaflet ? Postop - will obtain surgical evaluation . 4. S/p BiV pacemaker, placed for complete heart block. We will obtain medical records if possible. Patient has [employer] device which was interrogated. Patient in persistent AF since September 30 th. She remains V-paced. History of Presenting Illness:    Chief complain on admission : 27 y. o.year old who is admitted for  Chief Complaint   Patient presents with    Chest Pain     pacemaker in place         Past medical history:    has no past medical history on file. Past surgical history:   has no past surgical history on file. Social History:     Family history:  family history is not on file. No Known Allergies    Review of Systems:  Review of Systems   Respiratory: Negative for shortness of breath. Cardiovascular: Negative for chest pain, palpitations and leg swelling. Musculoskeletal: Negative. Skin: Negative. Neurological: Negative for dizziness and weakness. All other systems reviewed and are negative.        /72   Pulse 70   Temp 98.6 °F (37 °C) (Oral)   Resp 17   LMP 11/07/2021   SpO2 99%       Intake/Output Summary (Last 24 hours) at 12/2/2021 1202  Last data filed at 12/2/2021 4358  Gross per 24 hour   Intake 10 ml   Output --   Net 10 ml       Physical Exam:  Physical Exam  Constitutional:       Appearance: She is well-developed. Cardiovascular:      Rate and Rhythm: Normal rate and regular rhythm. Pulses: Intact distal pulses. Dorsalis pedis pulses are 2+ on the right side and 2+ on the left side. Posterior tibial pulses are 2+ on the right side and 2+ on the left side. Heart sounds: Normal heart sounds, S1 normal and S2 normal.   Pulmonary:      Effort: Pulmonary effort is normal.      Breath sounds: Normal breath sounds. Musculoskeletal:         General: Normal range of motion. Skin:     General: Skin is warm and dry. Neurological:      Mental Status: She is alert and oriented to person, place, and time. Medications:    sodium chloride flush  5-40 mL IntraVENous 2 times per day    enoxaparin  40 mg SubCUTAneous Daily    aspirin  81 mg Oral Daily    enalapril  10 mg Oral Daily    metoprolol succinate  25 mg Oral Daily      sodium chloride       sodium chloride flush, sodium chloride, ondansetron **OR** ondansetron, polyethylene glycol, acetaminophen **OR** acetaminophen    Lab Data:  CBC:   Recent Labs     11/30/21 2150   WBC 7.8   HGB 13.8   HCT 41.8   MCV 88.7        BMP:   Recent Labs     11/30/21 2150      K 3.9      CO2 22   BUN 13   CREATININE 0.6     PT/INR: No results for input(s): PROTIME, INR in the last 72 hours. BNP:    Recent Labs     11/30/21 2150   PROBNP 256.5     TROPONIN:   Recent Labs     11/30/21  2150 12/01/21  0410 12/01/21  1010   TROPONINT <0.010 <0.010 <0.010        Impression:  Active Problems:    Chest pain  Resolved Problems:    * No resolved hospital problems. *       All labs, medications and tests reviewed by myself, continue all other medications of all above medical condition listed as is except for changes mentioned above.     Thank you Please call with questions. Electronically signed by Re Mena. Yaya Hendricks, APRN - CNP on 12/2/2021 at 12:02 PM         [facility]    I have seen, spoken to and examined this patient personally, independently of the nurse practitioner. I have reviewed the hospital care given to date and reviewed all pertinent labs and imaging. The plan was developed mutually at the time of the visit with the patient,  NP   and myself. I have spoken with patient, nursing staff and provided written and verbal instructions . The above note has been reviewed and I agree with the assessment, diagnosis, and treatment plan with changes made by me as follows       HPI:  I have reviewed the above HPI  And agree with above   Please review addendum/changes made to note above     Interval history:        No further chest pain     Physical Exam:  General:  Awake, alert, NAD  Head:normal  Eye:normal  Neck:  No JVD   Chest:  Clear to auscultation, respiration easy  Cardiovascular:  s1s2  Abdomen:   nontender  Extremities:  0 edema  Pulses; palpable  Neuro: grossly normal      MEDICAL DECISION MAKING;    I agree with the above plan, which was planned by myself and discussed with NP. Case discussed with Dr. Benjamín Watson. BiV interrogation reveals underlying A. fib, ?  VVI pacing -we will obtain EP consultation prior to discharge    follow-up as outpatient with [facility] cardiology    Dr. Marquez Navarro MD

## 2021-12-02 NOTE — CONSULTS
Electrophysiology Consult Note      Reason for consultation: BIV optimization    Chief complaint : Shortness of breath    Referring physician:        Primary care physician: No primary care provider on file. History of Present Illness:     Patient is a 23-year-old female has history of aortic valve replacement with root enlargement along with mitral valve repair and left atrial appendage excision which was performed in [facility] at [facility] and [facility] patient in 2018 and subsequently she had biventricular pacemaker. Patient presents with shortness of breath  With minimal exertion and also lower extremity edema. Patient recently moved from [place] and has not established physicians in the area. Patient reports symptoms are ongoing for several weeks and getting progressively worse so she came to the hospital.  She denies any chest pain. Patient is originally from [facility] and [place] speaking she came to [address] [place] [address],[address] for heart surgery and the rest specific program.  Patient was supposed to go back to [facility] after her medical care but she did not return to [facility] because of her medical condition she reports. She does not speak English as good and there was a caretaker in the room who spoke for help. Pastmedical history:     aortic valve replacement with root enlargement along with mitral valve repair and left atrial appendage excision  Complete heart block status post biventricular pacemaker      Surgical history :     aortic valve replacement with root enlargement along with mitral valve repair and left atrial appendage excision      Family history:   No history of sudden cardiac death    Social history :       Patient denies smoking denies alcohol and denies recreational drugs    No Known Allergies    No current facility-administered medications on file prior to encounter.      Current Outpatient Medications on File Prior to Encounter   Medication Sig Dispense Refill    aspirin 81 MG EC tablet Take 81 mg by mouth daily      enalapril (VASOTEC) 10 MG tablet Take 10 mg by mouth daily      Metoprolol Succinate 25 MG CS24 Take 1 tablet by mouth daily      furosemide (LASIX) 40 MG tablet Take 40 mg by mouth 2 times daily         Review of Systems:   Review of Systems   Constitutional: Positive for fatigue. Negative for activity change, chills and fever. HENT: Negative for congestion, ear pain and tinnitus. Eyes: Negative for photophobia, pain and visual disturbance. Respiratory: Positive for shortness of breath. Negative for cough, chest tightness and wheezing. Cardiovascular: Positive for leg swelling. Negative for chest pain and palpitations. Gastrointestinal: Negative for abdominal pain, blood in stool, constipation, diarrhea, nausea and vomiting. Endocrine: Negative for cold intolerance and heat intolerance. Genitourinary: Negative for dysuria, flank pain and hematuria. Musculoskeletal: Negative for arthralgias, back pain, myalgias and neck stiffness. Skin: Negative for color change and rash. Allergic/Immunologic: Negative for food allergies. Neurological: Negative for dizziness, light-headedness, numbness and headaches. Hematological: Does not bruise/bleed easily. Psychiatric/Behavioral: Negative for agitation, behavioral problems and confusion. Examination:      Vitals:    12/01/21 0802 12/01/21 1230 12/01/21 2124 12/02/21 0741   BP: 112/73 109/83 93/64 102/72   Pulse:  70 71 70   Resp:  16 17    Temp:  98.2 °F (36.8 °C) 98.2 °F (36.8 °C) 98.6 °F (37 °C)   TempSrc:  Oral Oral Oral   SpO2: 99%  97% 99%        There is no height or weight on file to calculate BMI. Physical Exam  Constitutional:       General: She is not in acute distress. Appearance: She is not ill-appearing or diaphoretic. HENT:      Head: Normocephalic and atraumatic.       Right Ear: External ear normal.      Left Ear: External ear normal.      Nose: No congestion. Mouth/Throat:      Mouth: Mucous membranes are moist.   Eyes:      Extraocular Movements: Extraocular movements intact. Conjunctiva/sclera: Conjunctivae normal.      Pupils: Pupils are equal, round, and reactive to light. Cardiovascular:      Rate and Rhythm: Normal rate and regular rhythm. Heart sounds: Murmur (grade 2/6 systolic murmur) heard. Pulmonary:      Effort: Pulmonary effort is normal. No respiratory distress. Breath sounds: Normal breath sounds. No wheezing or rhonchi. Abdominal:      General: Abdomen is flat. Bowel sounds are normal. There is no distension. Palpations: Abdomen is soft. Tenderness: There is no abdominal tenderness. Musculoskeletal:         General: No tenderness. Cervical back: Normal range of motion. No tenderness. Right lower leg: No edema. Left lower leg: No edema. Skin:     General: Skin is warm. Neurological:      General: No focal deficit present. Mental Status: She is alert and oriented to person, place, and time. Cranial Nerves: No cranial nerve deficit. Psychiatric:         Mood and Affect: Mood normal.           CBC:   Lab Results   Component Value Date    WBC 7.8 11/30/2021    HGB 13.8 11/30/2021    HCT 41.8 11/30/2021     11/30/2021     Lipids:No results found for: CHOL, TRIG, HDL, LDLCALC, LDLDIRECT  PT/INR: No results found for: INR     BMP:    Lab Results   Component Value Date     11/30/2021    K 3.9 11/30/2021     11/30/2021    CO2 22 11/30/2021    BUN 13 11/30/2021     CMP:   Lab Results   Component Value Date    AST 19 11/30/2021    PROT 8.1 11/30/2021    BILITOT 0.4 11/30/2021    ALKPHOS 71 11/30/2021     TSH:  No results found for: TSH    EKGINTERPRETATION - EKG Interpretation:          ECHO   Left ventricular systolic function is abnormal.   Ejection fraction is visually estimated at 45-50%. The left ventricle appears dilated.    Mild left ventricular

## 2021-12-02 NOTE — CARE COORDINATION
Reviewed chart and spoke with pt via  #[id number]. Pt lives here in [facility] with her cousin at [facility]. PTA she was independent with ADL's and cousin can help with transportation. Pt has no PCP or insurance. [address], Transportation and Med Assist info in AVS.   Also left vm for [facility]ist for help with medications. Pt Denies any other needs at this time. CM available if any other needs arise.

## 2021-12-02 NOTE — PROGRESS NOTES
Hospitalist Progress Note      Name:  David Garcia /Age/Sex: 1995  (27 y.o. female)   MRN & CSN:  [id number] & [id number] Admission Date/Time: 2021 10:39 PM   Location:  [address] [place] [address]- PCP: No primary care provider on file. Hospital Day: 3    Assessment and Plan:   David Garcia is a 27 y.o.  female with past medical history of complete heart block s/p biventricular pacemaker placement, AV replacement with bioprosthetic aortic valve, severe mitral regurgitation, was admitted on 2021 for evaluation of chest pain. Seen by cardiology, unlikely cardiac in nature. Echocardiogram concerning for severe mitral regurgitation. Patient was seen by cardiothoracic surgery who recommended no surgical intervention at this time. Cardiology team recommends EP evaluation prior to discharge. Atypical chest pain  -Reproducible  -Unlikely ACS  -Cardiology evaluation appreciated    Severe mitral regurgitation will start status post bioprosthetic aortic valve  -Cardiothoracic evaluation appreciated  -No surgical intervention planned at this time. Complete heart block status post AICD placement  -Await pacer interrogation    ? History of CAD  -Continue aspirin/metoprolol/enalapril    DVT prophylaxis  -Lovenox subcu        Diet ADULT DIET; Regular; Low Fat/Low Chol/High Fiber/2 gm Na   DVT Prophylaxis [x] Lovenox, []  Heparin, [] SCDs, [] Ambulation   GI Prophylaxis [] PPI,  [] H2 Blocker,  [] Carafate,  [] Diet/Tube Feeds   Code Status Full Code   Disposition Patient requires continued admission due to cardiac work-up   MDM [] Low, [x] Moderate,[]  High  Patient's risk as above due to cardiac symptoms     History of Present Illness:     Chief Complaint: See above    Subjective    Patient seen and examined at bedside.  services were used as patient is primarily [place] speaking. Patient denies any chest pain, shortness of breath or cough at this time.        Ten point ANSELMO reviewed negative, unless as noted above    Objective: Intake/Output Summary (Last 24 hours) at 12/2/2021 1403  Last data filed at 12/2/2021 0829  Gross per 24 hour   Intake 10 ml   Output --   Net 10 ml      Vitals:   Vitals:    12/02/21 0741   BP: 102/72   Pulse: 70   Resp:    Temp: 98.6 °F (37 °C)   SpO2: 99%     Physical Exam:   GEN no acute distress  RESP Clear to auscultation, no wheezes, rales or rhonchi. Symmetric chest movement while on room air. CARDIO/VASC S1/S2 auscultated. Systolic murmur. Regular rate  No peripheral edema. GI Abdomen is soft without significant tenderness, masses, or guarding. Bowel sounds are normoactive. NEURO AAOx3. No gross focal neurological deficit evident at this time.     Medications:   Medications:    sodium chloride flush  5-40 mL IntraVENous 2 times per day    enoxaparin  40 mg SubCUTAneous Daily    aspirin  81 mg Oral Daily    enalapril  10 mg Oral Daily    metoprolol succinate  25 mg Oral Daily      Infusions:    sodium chloride       PRN Meds: sodium chloride flush, 5-40 mL, PRN  sodium chloride, 25 mL, PRN  ondansetron, 4 mg, Q8H PRN   Or  ondansetron, 4 mg, Q6H PRN  polyethylene glycol, 17 g, Daily PRN  acetaminophen, 650 mg, Q6H PRN   Or  acetaminophen, 650 mg, Q6H PRN          Electronically signed by Bindu Martinez MD on 12/2/2021 at 2:03 PM

## 2021-12-02 NOTE — PROGRESS NOTES
Still received for BiV optimization  Patient LVEF is 45 to 50% and she is BiV paced and she is in complete heart block and she has a [employer] [place] BiV pacer. Initial office it was 30 ms.   We performed EKGs with different offsets and different vectors and we adjusted the pacemaker for best electrical resynchronization vectors  Patient appears to be in persistent atrial fibrillation and has severe eccentric MR but she is complete heart block and is pacing dependent    Full note to follow

## 2021-12-03 VITALS
SYSTOLIC BLOOD PRESSURE: 102 MMHG | RESPIRATION RATE: 18 BRPM | HEART RATE: 69 BPM | DIASTOLIC BLOOD PRESSURE: 71 MMHG | OXYGEN SATURATION: 99 % | TEMPERATURE: 98 F

## 2021-12-03 LAB
ANION GAP SERPL CALCULATED.3IONS-SCNC: 10 MMOL/L (ref 4–16)
BUN BLDV-MCNC: 13 MG/DL (ref 6–23)
CALCIUM SERPL-MCNC: 9 MG/DL (ref 8.3–10.6)
CHLORIDE BLD-SCNC: 100 MMOL/L (ref 99–110)
CO2: 23 MMOL/L (ref 21–32)
CREAT SERPL-MCNC: 0.5 MG/DL (ref 0.6–1.1)
GFR AFRICAN AMERICAN: >60 ML/MIN/1.73M2
GFR NON-AFRICAN AMERICAN: >60 ML/MIN/1.73M2
GLUCOSE BLD-MCNC: 87 MG/DL (ref 70–99)
HCT VFR BLD CALC: 40.5 % (ref 37–47)
HEMOGLOBIN: 13.5 GM/DL (ref 12.5–16)
MAGNESIUM: 2 MG/DL (ref 1.8–2.4)
MCH RBC QN AUTO: 30.2 PG (ref 27–31)
MCHC RBC AUTO-ENTMCNC: 33.3 % (ref 32–36)
MCV RBC AUTO: 90.6 FL (ref 78–100)
PDW BLD-RTO: 12.9 % (ref 11.7–14.9)
PLATELET # BLD: 238 K/CU MM (ref 140–440)
PMV BLD AUTO: 10.1 FL (ref 7.5–11.1)
POTASSIUM SERPL-SCNC: 4.6 MMOL/L (ref 3.5–5.1)
RBC # BLD: 4.47 M/CU MM (ref 4.2–5.4)
SODIUM BLD-SCNC: 133 MMOL/L (ref 135–145)
WBC # BLD: 5 K/CU MM (ref 4–10.5)

## 2021-12-03 PROCEDURE — 85027 COMPLETE CBC AUTOMATED: CPT

## 2021-12-03 PROCEDURE — 80048 BASIC METABOLIC PNL TOTAL CA: CPT

## 2021-12-03 PROCEDURE — 6370000000 HC RX 637 (ALT 250 FOR IP): Performed by: INTERNAL MEDICINE

## 2021-12-03 PROCEDURE — 6360000002 HC RX W HCPCS: Performed by: INTERNAL MEDICINE

## 2021-12-03 PROCEDURE — 2580000003 HC RX 258: Performed by: INTERNAL MEDICINE

## 2021-12-03 PROCEDURE — 99233 SBSQ HOSP IP/OBS HIGH 50: CPT | Performed by: INTERNAL MEDICINE

## 2021-12-03 PROCEDURE — 36415 COLL VENOUS BLD VENIPUNCTURE: CPT

## 2021-12-03 PROCEDURE — 94761 N-INVAS EAR/PLS OXIMETRY MLT: CPT

## 2021-12-03 PROCEDURE — 83735 ASSAY OF MAGNESIUM: CPT

## 2021-12-03 RX ORDER — METOPROLOL SUCCINATE 25 MG/1
25 TABLET, EXTENDED RELEASE ORAL DAILY
Qty: 30 TABLET | Refills: 3 | Status: SHIPPED | OUTPATIENT
Start: 2021-12-04

## 2021-12-03 RX ORDER — FUROSEMIDE 20 MG/1
20 TABLET ORAL DAILY PRN
Qty: 30 TABLET | Refills: 3 | Status: SHIPPED | OUTPATIENT
Start: 2021-12-03 | End: 2022-01-02

## 2021-12-03 RX ORDER — ENALAPRIL MALEATE 10 MG/1
10 TABLET ORAL DAILY
Qty: 30 TABLET | Refills: 3 | Status: SHIPPED | OUTPATIENT
Start: 2021-12-03

## 2021-12-03 RX ADMIN — ENOXAPARIN SODIUM 40 MG: 100 INJECTION SUBCUTANEOUS at 09:54

## 2021-12-03 RX ADMIN — ENALAPRIL MALEATE 10 MG: 5 TABLET ORAL at 09:54

## 2021-12-03 RX ADMIN — SODIUM CHLORIDE, PRESERVATIVE FREE 10 ML: 5 INJECTION INTRAVENOUS at 10:12

## 2021-12-03 RX ADMIN — METOPROLOL SUCCINATE 25 MG: 25 TABLET, EXTENDED RELEASE ORAL at 09:53

## 2021-12-03 RX ADMIN — ASPIRIN 325 MG: 325 TABLET, COATED ORAL at 09:54

## 2021-12-03 RX ADMIN — ACETAMINOPHEN 650 MG: 325 TABLET ORAL at 04:02

## 2021-12-03 ASSESSMENT — PAIN SCALES - GENERAL
PAINLEVEL_OUTOF10: 5
PAINLEVEL_OUTOF10: 0

## 2021-12-03 ASSESSMENT — ENCOUNTER SYMPTOMS: SHORTNESS OF BREATH: 0

## 2021-12-03 NOTE — PROGRESS NOTES
Physician Progress Note      Kami Epps  HERRERA #:                  [id number]  :                       1995  ADMIT DATE:       2021 10:39 PM  [address] DATE:        12/3/2021 2:23 PM  RESPONDING  PROVIDER #:        Jovan Quarles          QUERY TEXT:    Hospitalists,    Pt admitted with chest pain. If possible, please document in progress notes   and discharge summary the suspected cause of the chest pain:]    The medical record reflects the following:  Risk Factors: a fib, cardiomyopathy  Clinical Indicators: cardiomyopathy, VHD, atrial fib,  Treatment: labs, imaging, Cardiology consult, ECHO    Thank you,  Hadley Dhaliwal RN [facility]  [phone]  Options provided:  -- Chest pain due to atrial fibrillation  -- Chest pain due to GERD  -- Chest pain due to ischemic cardiomyopathy  -- Chest pain due to severe mitral regurgitation  -- Chest pain due to  ##please specify, please specify.   -- Other - I will add my own diagnosis  -- Disagree - Not applicable / Not valid  -- Disagree - Clinically unable to determine / Unknown  -- Refer to Clinical Documentation Reviewer    PROVIDER RESPONSE TEXT:    This patient has chest pain due to Atypical? Musculoskeletal    Query created by: Raul Johnson on 12/3/2021 2:12 PM      Electronically signed by:  Jovan Quarles 12/3/2021 2:53 PM

## 2021-12-03 NOTE — CARE COORDINATION
Received a call from [address] at [facility] regarding pt's d/c needs. Pt has pending insurance and is not a [place] citizen. We have not helped pt before. I did ok to put pt on the referral list. Pt's PTA medications: ASA, enalapril, metoprolol  Succinate and furosemide. If voucher is issued, she will be put on the flag list./MB

## 2021-12-03 NOTE — PLAN OF CARE
Problem: Falls - Risk of:  Goal: Will remain free from falls  Description: Will remain free from falls  12/3/2021 0338 by Inocencio Wu RN  Outcome: Ongoing  12/2/2021 2239 by Raji Pearson LPN  Outcome: Ongoing  Goal: Absence of physical injury  Description: Absence of physical injury  12/3/2021 0338 by Inocencio Wu RN  Outcome: Ongoing  12/2/2021 2239 by Raji Pearson LPN  Outcome: Ongoing

## 2021-12-03 NOTE — PROGRESS NOTES
Cardiology Progress Note     Today's Plan: interrogate device    Admit Date:  11/30/2021    Consult reason/ Seen today for: Chest pain     Subjective and  Overnight Events: Virtual  used- Reports chest pain resolved not currently having SOB. Assessment / Plan / Recommendation:     1. Noncardiac chest pain : Reproducible on touch. No ischemic work-up planned. Patient also has epigastric discomfort and tenderness. EKG shows atrial sensed ventricular paced rhythm. Cardiac troponins are negative. No ischemia noted otherwise. 2. History of aortic valve replacement, bioprosthetic in 2018-recommend aspirin. 3. Severe posteriorly directed mitral regurgitation with tethering of anterior mitral leaflet ? Postop -  appreciate surgey input. No surgical intervention indicated currently     4. Perm. Afib   5. CHB  6. ? CMP S/p BiV     [employer] device which was interrogated. Patient in persistent AF since September 30 th. She remains V-paced. Appreciate EP input  Pt CHADSVASC 1 for sex. No documented HTN. Unclear circumstances leading to BIV and not PM. EF is fairly normal  Recommend ASA instead of [address] [place] [address] at this time  D/w hospitalist physician    Pt can be d/c from cardiology stand point         History of Presenting Illness:    Chief complain on admission : 27 y. o.year old who is admitted for  Chief Complaint   Patient presents with    Chest Pain     pacemaker in place         Past medical history:    has no past medical history on file. Past surgical history:   has no past surgical history on file. Social History:     Family history:  family history is not on file. No Known Allergies    Review of Systems:  Review of Systems   Respiratory: Negative for shortness of breath. Cardiovascular: Negative for chest pain, palpitations and leg swelling. Musculoskeletal: Negative. Skin: Negative.     Neurological: Negative for dizziness and weakness. All other systems reviewed and are negative. /71   Pulse 69   Temp 98 °F (36.7 °C) (Oral)   Resp 18   LMP 11/07/2021   SpO2 99%     No intake or output data in the 24 hours ending 12/03/21 1216    Physical Exam:  Physical Exam  Constitutional:       Appearance: She is well-developed. Cardiovascular:      Rate and Rhythm: Normal rate and regular rhythm. Pulses: Intact distal pulses. Dorsalis pedis pulses are 2+ on the right side and 2+ on the left side. Posterior tibial pulses are 2+ on the right side and 2+ on the left side. Heart sounds: Normal heart sounds, S1 normal and S2 normal.   Pulmonary:      Effort: Pulmonary effort is normal.      Breath sounds: Normal breath sounds. Musculoskeletal:         General: Normal range of motion. Skin:     General: Skin is warm and dry. Neurological:      Mental Status: She is alert and oriented to person, place, and time. Medications:    aspirin  325 mg Oral Daily    sodium chloride flush  5-40 mL IntraVENous 2 times per day    enoxaparin  40 mg SubCUTAneous Daily    enalapril  10 mg Oral Daily    metoprolol succinate  25 mg Oral Daily      sodium chloride       sodium chloride flush, sodium chloride, ondansetron **OR** ondansetron, polyethylene glycol, acetaminophen **OR** acetaminophen    Lab Data:  CBC:   Recent Labs     11/30/21 2150 12/03/21  1140   WBC 7.8 5.0   HGB 13.8 13.5   HCT 41.8 40.5   MCV 88.7 90.6    238     BMP:   Recent Labs     11/30/21 2150      K 3.9      CO2 22   BUN 13   CREATININE 0.6     PT/INR: No results for input(s): PROTIME, INR in the last 72 hours. BNP:    Recent Labs     11/30/21 2150   PROBNP 256.5     TROPONIN:   Recent Labs     11/30/21  2150 12/01/21  0410 12/01/21  1010   TROPONINT <0.010 <0.010 <0.010        Impression:  Active Problems:    Chest pain  Resolved Problems:    * No resolved hospital problems.

## 2021-12-03 NOTE — PROGRESS NOTES
Outpatient Pharmacy Progress Note for Meds-to-Beds    Total number of Prescriptions Filled: 3  The following medications were delivered to the patient or nursing unit during the discharge process:   Enalapril 5mg   Metoprolol ER 25mg   Furosemide 20mg    Additional Documentation:   Med Assist voucher covered copays   Aspirin offered for OTC purchase      Thank you for letting us serve your patients.   [facility]    [address], [address] [facility]    Phone: [phone]    Fax: [phone]

## 2021-12-03 NOTE — CARE COORDINATION
Received a call from Juan Manuel Hdz in the outpt pharmacy regarding pt's d/c medications. Pt was already on our referral list. Voucher created and faxed to the outpt pharmacy: enalapril, metoprolol succinate, furosemide. Not covered;  because you can get this OTC.  Pt will go on the flag list./MB

## 2021-12-03 NOTE — DISCHARGE SUMMARY
Discharge Summary           Name:  Rolan Borjas /Age/Sex: 1995  (27 y.o. female)   MRN & CSN:  [id number] & [id number] Admission Date/Time: 2021 10:39 PM   Attending:  Jose Ring MD Discharging Physician: Jose Ring MD     Hospital Course:   Rolan Borjas is a 27 y.o.  female with past medical history of complete heart block s/p biventricular pacemaker placement, AV replacement with bioprosthetic aortic valve, severe mitral regurgitation, was admitted on 2021 for evaluation of chest pain. Seen by cardiology, unlikely cardiac in nature. Echocardiogram concerning for severe eccentric mitral regurgitation. Patient was seen by cardiothoracic surgery who recommended no surgical intervention at this time. Advise outpatient follow-up. Cardiology team recommends EP evaluation prior to discharge. As per ED evaluation, pacemaker adjustment for best electrical resynchronization vectors was done. Patient was also noted to be in persistent atrial fibrillation. [id number]-OSHg score of 1, started on full dose aspirin. At this time, patient has no insurance, /social work aware. Outpatient medications being filled at the hospital.  Patient is currently being discharged in stable condition. Discharge diagnosis     Atypical chest pain  Severe mitral regurgitation   status post bioprosthetic aortic valve  Persistent atrial fibrillation  Cardiomyopathy ischemic versus nonischemic  Complete heart block status post AICD placement    The patient expressed appropriate understanding of and agreement with the discharge recommendations, medications, and plan.      Consults this admission:  IP CONSULT TO HOSPITALIST  IP CONSULT TO CARDIOLOGY  IP CONSULT TO [address] SURGERY    Discharge Instruction:   Follow up appointments: Advised follow-up with PCP/cardiology as outpatient  Primary care physician:  within 1 weeks    Diet:  cardiac diet   Activity: activity as tolerated  Disposition: Discharged to:   [x]Home, [][facility], []SNF, []Acute Rehab, []Hospice   Condition on discharge: Stable    Discharge Medications:        Medication List      START taking these medications    metoprolol succinate 25 MG extended release tablet  Commonly known as: TOPROL XL  Take 1 tablet by mouth daily  Start taking on: December 4, 2021  Replaces: Metoprolol Succinate 25 MG Cs24        CHANGE how you take these medications    aspirin 325 MG EC tablet  Take 1 tablet by mouth daily  Start taking on: December 4, 2021  What changed:   · medication strength  · how much to take     furosemide 20 MG tablet  Commonly known as: Lasix  Take 1 tablet by mouth daily as needed (If weight increases by 2 lbs in a day or 5 lbs in a week)  What changed:   · medication strength  · how much to take  · when to take this  · reasons to take this        CONTINUE taking these medications    enalapril 10 MG tablet  Commonly known as: VASOTEC  Take 1 tablet by mouth daily        STOP taking these medications    Metoprolol Succinate 25 MG Cs24  Replaced by: metoprolol succinate 25 MG extended release tablet           Where to Get Your Medications      These medications were sent to [address] [place] [address] [address] [id number]    Phone: [phone]   · aspirin 325 MG EC tablet  · enalapril 10 MG tablet  · furosemide 20 MG tablet  · metoprolol succinate 25 MG extended release tablet          Objective Findings at Discharge:   /71   Pulse 69   Temp 98 °F (36.7 °C) (Oral)   Resp 18   LMP 11/07/2021   SpO2 99%            PHYSICAL EXAM  GEN    no acute distress  RESP  Clear to auscultation, no wheezes, rales or rhonchi. Symmetric chest movement while on room air. CARDIO/VASC           S1/S2 auscultated. Systolic murmur. Regular rate  No peripheral edema.   GI        Abdomen is soft without significant tenderness, masses, or guarding. Bowel sounds are normoactive. NEURO AAOx3. No gross focal neurological deficit evident at this time.     BMP/CBC  Recent Labs     11/30/21 2150 12/03/21  1140     --    K 3.9  --      --    CO2 22  --    BUN 13  --    CREATININE 0.6  --    WBC 7.8 5.0   HCT 41.8 40.5    238       Discharge Time of 35 minutes    Electronically signed by Stephanie Villatoro MD on 12/3/2021 at 12:33 PM

## 2021-12-06 LAB
CULTURE: NORMAL
CULTURE: NORMAL
Lab: NORMAL
Lab: NORMAL
SPECIMEN: NORMAL
SPECIMEN: NORMAL

## 2021-12-15 ENCOUNTER — OFFICE VISIT (OUTPATIENT)
Dept: CARDIOLOGY CLINIC | Age: 26
End: 2021-12-15
Payer: MEDICAID

## 2021-12-15 VITALS
DIASTOLIC BLOOD PRESSURE: 70 MMHG | HEART RATE: 70 BPM | WEIGHT: 114 LBS | BODY MASS INDEX: 19.46 KG/M2 | SYSTOLIC BLOOD PRESSURE: 126 MMHG | HEIGHT: 64 IN

## 2021-12-15 DIAGNOSIS — R07.9 CHEST PAIN, UNSPECIFIED TYPE: Primary | ICD-10-CM

## 2021-12-15 DIAGNOSIS — Z95.2 S/P AVR (AORTIC VALVE REPLACEMENT): ICD-10-CM

## 2021-12-15 DIAGNOSIS — I34.0 MITRAL VALVE INSUFFICIENCY, UNSPECIFIED ETIOLOGY: ICD-10-CM

## 2021-12-15 DIAGNOSIS — I48.91 ATRIAL FIBRILLATION, UNSPECIFIED TYPE (HCC): ICD-10-CM

## 2021-12-15 PROCEDURE — 99214 OFFICE O/P EST MOD 30 MIN: CPT | Performed by: INTERNAL MEDICINE

## 2021-12-15 PROCEDURE — 93000 ELECTROCARDIOGRAM COMPLETE: CPT | Performed by: INTERNAL MEDICINE

## 2021-12-15 NOTE — PATIENT INSTRUCTIONS
Please be informed that if you contact our office outside of normal business hours the physician on call cannot help with any scheduling or rescheduling issues, procedure instruction questions or any type of medication issue. We advise you for any urgent/emergency that you go to the nearest emergency room! PLEASE CALL OUR OFFICE DURING NORMAL BUSINESS HOURS    Monday - Friday   8 am to 5 pm    [place]: [address]: [phone]    [place]:  [phone]    **It is YOUR responsibilty to bring medication bottles and/or updated medication list to [address] [place] [address].  This will allow us to better serve you and all your healthcare needs**

## 2021-12-15 NOTE — LETTER
Maureen Kidd  1995  [id number]    Have you had any Chest Pain that is not new? - Yes  If Yes DO EKG - How does it feel - Sharp Pain   How long does the pain last - 5 minutes    How long have you been having the pain - Day's   Did you take a Pain Medications   And did it relieve the pain - Yes     DO EKG IF: Patient has a Heart Rate above 100 or below 40     CAD (Coronary Artery Disease) patient should have one on file every 6 months        Have you had any Shortness of Breath - Yes  If Yes - When at rest and on exertion    Have you had any dizziness - No  If Yes DO ORTHOSTATIC BP - when do you feel dizzy    How long does it last .        Sitting wait 5 minutes do supine (laying down) wait 5 minutes then do standing - log each in \"vitals\" area in Epic   Be sure to ask what symptoms they are having if they get dizzy while completing ortho stats such as: room spinning, nausea, etc.    Have you had any palpitations that are not new? - Yes  If Yes DO EKG - Do you feel your heart racing  How long does it last - .5  minutes     Is the patient on any of the following medications -   If Yes DO EKG - Needs done every 6 months    Do you have any edema - swelling in No    If Yes - CHECK TO SEE IF THE EDEMA IS PITTING  How long have they been having edema -   If Yes - Have they worn compression stockings   If they have worn compression stockings      Vein \"LEG PROBLEM Questionnaire\"  1. Do you have prominent leg veins? 2. Do you have any skin discoloration? 3. Do you have any healed or active sores? 4. Do you have swelling of the legs? 5. Do you have a family history of varicose veins? 6. Does your profession involve pro-longed        standing or heavy lifting? 7. Have you been fighting overweight problems? 8. Do you have restless legs? 9. Do you have any night time cramps? No  10.  Do you have any of the following in your legs: Aching     When did you have your last labs drawn 12/3/21  Where did you have them done   What doctor ordered     If we do not have these labs you are retrieve these labs for these providers! Do you have a surgery or procedure scheduled in the near future - No  If Yes- DO EKG  If Yes - Who is the surgery with?    Phone number of physician   Fax number of physician   Type of surgery   GIVE THIS INFORMATION TO PERI SAXENA     Ask patient if they want to [facility] if they are not already signed up     Check to see if we have an E-MAIL on file for the patient     Check medication list thoroughly!!! AND RECONCILE OUTSIDE MEDICATIONS  If dose has changed change the entire order not just the [place] At check out add to every patient's \"wrap up\" the following dot phrase AFTERHOURSEDUCATION and ensure we explain this to our patients

## 2021-12-15 NOTE — PROGRESS NOTES
Hesahm Tucker MD                                  CARDIOLOGY  NOTE        Chief Complaint:    Chief Complaint   Patient presents with    Chest Pain     Pt has had chest pain that radiates to both arms, takes tylenol for the pain and it goes away for the moment but can sometimes come back later that day, heart palpations racing, soboe. Pt doesnt drink caffiene, doesnt smoke, doesnt drink alcohol, doesnt exercise.  Follow-Up from Hospital        HPI:     Antonio Westfall is a 27y.o. year old female who was recently evaluated in the hospital.    Patient is from [place], does not speak [place]. She has prior medical history significant for aortic valve replacement bioprosthetic 2018. Patient also has underlying permanent atrial fibrillation and complete heart block s/p BiV    Patient presents as a follow-up from hospital  She complains of intermittent chest pains. Current Outpatient Medications   Medication Sig Dispense Refill    aspirin 325 MG EC tablet Take 1 tablet by mouth daily 30 tablet 3    enalapril (VASOTEC) 10 MG tablet Take 1 tablet by mouth daily 30 tablet 3    metoprolol succinate (TOPROL XL) 25 MG extended release tablet Take 1 tablet by mouth daily 30 tablet 3    furosemide (LASIX) 20 MG tablet Take 1 tablet by mouth daily as needed (If weight increases by 2 lbs in a day or 5 lbs in a week) 30 tablet 3     No current facility-administered medications for this visit. Allergies:     Patient has no known allergies. Patient History:    History reviewed. No pertinent past medical history. History reviewed. No pertinent surgical history. History reviewed. No pertinent family history.   Social History     Tobacco Use    Smoking status: Never Smoker    Smokeless tobacco: Never Used   Substance Use Topics    Alcohol use: Not on file        Review of Systems:     · Constitutional:  No Fever or Weight Loss   · Eyes: No Decreased Vision  · ENT: No Headaches, Hearing Loss or Vertigo  · Cardiovascular: No Chest Pain,  No Shortness of breath, No Palpitations. No Edema   · Respiratory: No cough or wheezing . No Respiratory distress   · Gastrointestinal: No abdominal pain, appetite loss, blood in stools, constipation, diarrhea or heartburn  · Genitourinary: No dysuria, trouble voiding, or hematuria  · Musculoskeletal:  denies any new  joint aches , or pain   · Integumentary: No rash or pruritis  · Neurological: No TIA or stroke symptoms  · Psychiatric: No anxiety or depression  · Endocrine: No malaise, fatigue or temperature intolerance  · Hematologic/Lymphatic: No bleeding problems, blood clots or swollen lymph nodes  · Allergic/Immunologic: No nasal congestion or hives        Objective:      Physical Exam:    /70 (Site: Right Upper Arm, Position: Sitting, Cuff Size: Medium Adult)   Pulse 70   Ht 5' 4\" (1.626 m)   Wt 114 lb (51.7 kg)   BMI 19.57 kg/m²   Wt Readings from Last 3 Encounters:   12/15/21 114 lb (51.7 kg)     Body mass index is 19.57 kg/m². Vitals:    12/15/21 1649   BP: 126/70   Pulse: 70        General Appearance and Constitutional: Conversant, Well developed, Well nourished, No acute distress, Non-toxic appearance. HEENT:  Normocephalic, Atraumatic, Bilateral external ears normal, Oropharynx moist, No oral exudates,   Nose normal.   Neck- Normal range of motion, No tenderness, Supple  Eyes:  EOMI, Conjunctiva normal, No discharge. Respiratory:  Normal breath sounds, No respiratory distress, No wheezing, No Rales, No Ronchi. No chest tenderness. Cardiovascular: S1-S2, no added heart sounds, No Mumurs appreciated. No gallops, rubs. No Pedal Edema   GI:  Bowel sounds normal, Soft, No tenderness,  :  No costovertebral angle tenderness   Musculoskeletal:  No gross deformities.  Back- No tenderness  Integument:  Well hydrated, no rash   Lymphatic:  No lymphadenopathy noted   Neurologic:  Alert & oriented x 3, Normal motor function, normal sensory function, no focal deficits noted   Psychiatric:  Speech and behavior appropriate       Medical decision making and Data review:    DATA:    Lab Results   Component Value Date    TROPONINT <0.010 12/01/2021     BNP:    Lab Results   Component Value Date    PROBNP 256.5 11/30/2021     PT/INR:  No results found for: PTINR  No results found for: LABA1C  No results found for: CHOL, TRIG, HDL, LDLCALC, LDLDIRECT  Lab Results   Component Value Date    WBC 5.0 12/03/2021    HGB 13.5 12/03/2021    HCT 40.5 12/03/2021    MCV 90.6 12/03/2021     12/03/2021     TSH: No results found for: TSH  Lab Results   Component Value Date    AST 19 11/30/2021    ALT 15 11/30/2021    BILITOT 0.4 11/30/2021    ALKPHOS 71 11/30/2021         All labs, medications and tests reviewed by myself including data and history from outside source , patient and available family . 1. Chest pain, unspecified type    2. S/P AVR (aortic valve replacement)    3. Mitral valve insufficiency, unspecified etiology    4. Atrial fibrillation, unspecified type ([address]         Impression and Plan:      1. Noncardiac chest pain : Reproducible on touch.  No ischemic work-up planned.  Patient also has epigastric discomfort and tenderness.  EKG shows atrial sensed ventricular paced rhythm.  Recent Cardiac troponins are negative.  No ischemia noted otherwise.     2. History of aortic valve replacement, bioprosthetic in 2018 in [place]-  recommend aspirin.     3. Severe posteriorly directed mitral regurgitation with tethering of anterior mitral leaflet ?  Postop -  Pt evaluated by CVT tal - Dr Cassy Kwok in hospital . No surgical intervention indicated currently  - Cont Low dose Lasix PRN      4. Perm. Afib   5. CHB  6. ? CMP S/p BiV      [employer] device which was interrogated in hospital. Patient in persistent AF since September 30th 2021. She remains V-paced. Pt also evaluated by Dr. Venkat Shields for sex. No documented HTN.   Unclear circumstances leading to BIV and not PM. EF is fairly normal  Recommend  mg instead of [address] at this time        Return in about 1 year (around 12/15/2022). Counseled extensively and medication compliance urged. We discussed that for the  prevention of ASCVD our  goal is aggressive risk modification. Patient is encouraged to exercise even a brisk walk for 30 minutes  at least 3 to 4 times a week   Various goals were discussed and questions answered. Continue current medications. Appropriate prescriptions are addressed and refills ordered. Questions answered and patient verbalizes understanding. Call for any problems, questions, or concerns.

## 2021-12-22 ASSESSMENT — ENCOUNTER SYMPTOMS
ABDOMINAL PAIN: 0
NAUSEA: 0
EYE PAIN: 0
COLOR CHANGE: 0
VOMITING: 0
PHOTOPHOBIA: 0
COUGH: 0
CHEST TIGHTNESS: 0
SHORTNESS OF BREATH: 1
BLOOD IN STOOL: 0
BACK PAIN: 0
CONSTIPATION: 0
DIARRHEA: 0
WHEEZING: 0

## 2022-05-15 ENCOUNTER — APPOINTMENT (OUTPATIENT)
Dept: GENERAL RADIOLOGY | Age: 27
End: 2022-05-15

## 2022-05-15 ENCOUNTER — HOSPITAL ENCOUNTER (EMERGENCY)
Age: 27
Discharge: HOME | End: 2022-05-15

## 2022-05-15 VITALS
OXYGEN SATURATION: 100 % | HEART RATE: 70 BPM | BODY MASS INDEX: 19.46 KG/M2 | TEMPERATURE: 97.8 F | WEIGHT: 114 LBS | SYSTOLIC BLOOD PRESSURE: 145 MMHG | HEIGHT: 64 IN | DIASTOLIC BLOOD PRESSURE: 128 MMHG | RESPIRATION RATE: 17 BRPM

## 2022-05-15 DIAGNOSIS — Z53.21 PATIENT LEFT WITHOUT BEING SEEN: Primary | ICD-10-CM

## 2022-05-15 ASSESSMENT — PAIN - FUNCTIONAL ASSESSMENT: PAIN_FUNCTIONAL_ASSESSMENT: 0-10

## 2022-05-15 ASSESSMENT — PAIN SCALES - GENERAL: PAINLEVEL_OUTOF10: 10

## 2022-05-15 NOTE — ED PROVIDER NOTES
I did not see and/or evaluate this patient. She was seen by nursing staff walking out of the emergency department. At this point we can feel that she left after her nursing triage.      MCKAYLA Watson  05/15/22 [id number]

## 2022-05-15 NOTE — ED TRIAGE NOTES
Patient to triage. Waiting for  to connect - patient screaming and appears to be in pain. Family member at bedside.

## 2022-05-15 NOTE — ED TRIAGE NOTES
attempted to be used for triage, ID number [id number]. Patient shaking and thrashing and not speaking to .

## 2022-11-03 ENCOUNTER — APPOINTMENT (OUTPATIENT)
Dept: GENERAL RADIOLOGY | Age: 27
End: 2022-11-03

## 2022-11-03 ENCOUNTER — HOSPITAL ENCOUNTER (EMERGENCY)
Age: 27
Discharge: HOME | End: 2022-11-03
Attending: EMERGENCY MEDICINE

## 2022-11-03 VITALS
OXYGEN SATURATION: 99 % | RESPIRATION RATE: 16 BRPM | DIASTOLIC BLOOD PRESSURE: 76 MMHG | HEART RATE: 70 BPM | WEIGHT: 130 LBS | SYSTOLIC BLOOD PRESSURE: 112 MMHG | BODY MASS INDEX: 22.31 KG/M2 | TEMPERATURE: 98.4 F

## 2022-11-03 DIAGNOSIS — R07.9 CHEST PAIN, UNSPECIFIED TYPE: Primary | ICD-10-CM

## 2022-11-03 LAB
ALBUMIN SERPL-MCNC: 4.5 GM/DL (ref 3.4–5)
ALP BLD-CCNC: 66 IU/L (ref 40–129)
ALT SERPL-CCNC: 13 U/L (ref 10–40)
ANION GAP SERPL CALCULATED.3IONS-SCNC: 13 MMOL/L (ref 4–16)
AST SERPL-CCNC: 20 IU/L (ref 15–37)
BASOPHILS ABSOLUTE: 0 K/CU MM
BASOPHILS RELATIVE PERCENT: 0.2 % (ref 0–1)
BILIRUB SERPL-MCNC: 0.4 MG/DL (ref 0–1)
BUN BLDV-MCNC: 13 MG/DL (ref 6–23)
CALCIUM SERPL-MCNC: 9.8 MG/DL (ref 8.3–10.6)
CHLORIDE BLD-SCNC: 102 MMOL/L (ref 99–110)
CO2: 22 MMOL/L (ref 21–32)
CREAT SERPL-MCNC: 0.6 MG/DL (ref 0.6–1.1)
DIFFERENTIAL TYPE: ABNORMAL
EOSINOPHILS ABSOLUTE: 0.1 K/CU MM
EOSINOPHILS RELATIVE PERCENT: 0.8 % (ref 0–3)
GFR SERPL CREATININE-BSD FRML MDRD: >60 ML/MIN/1.73M2
GLUCOSE BLD-MCNC: 114 MG/DL (ref 70–99)
HCT VFR BLD CALC: 39.7 % (ref 37–47)
HEMOGLOBIN: 13.7 GM/DL (ref 12.5–16)
IMMATURE NEUTROPHIL %: 0.1 % (ref 0–0.43)
LYMPHOCYTES ABSOLUTE: 3.1 K/CU MM
LYMPHOCYTES RELATIVE PERCENT: 37.2 % (ref 24–44)
MCH RBC QN AUTO: 30.2 PG (ref 27–31)
MCHC RBC AUTO-ENTMCNC: 34.5 % (ref 32–36)
MCV RBC AUTO: 87.6 FL (ref 78–100)
MONOCYTES ABSOLUTE: 0.6 K/CU MM
MONOCYTES RELATIVE PERCENT: 7.5 % (ref 0–4)
NUCLEATED RBC %: 0 %
PDW BLD-RTO: 12.1 % (ref 11.7–14.9)
PLATELET # BLD: 245 K/CU MM (ref 140–440)
PMV BLD AUTO: 10.3 FL (ref 7.5–11.1)
POTASSIUM SERPL-SCNC: 3.8 MMOL/L (ref 3.5–5.1)
RBC # BLD: 4.53 M/CU MM (ref 4.2–5.4)
SEGMENTED NEUTROPHILS ABSOLUTE COUNT: 4.5 K/CU MM
SEGMENTED NEUTROPHILS RELATIVE PERCENT: 54.2 % (ref 36–66)
SODIUM BLD-SCNC: 137 MMOL/L (ref 135–145)
TOTAL IMMATURE NEUTOROPHIL: 0.01 K/CU MM
TOTAL NUCLEATED RBC: 0 K/CU MM
TOTAL PROTEIN: 7.8 GM/DL (ref 6.4–8.2)
TROPONIN T: <0.01 NG/ML
WBC # BLD: 8.3 K/CU MM (ref 4–10.5)

## 2022-11-03 PROCEDURE — 6360000002 HC RX W HCPCS: Performed by: EMERGENCY MEDICINE

## 2022-11-03 PROCEDURE — 84484 ASSAY OF TROPONIN QUANT: CPT

## 2022-11-03 PROCEDURE — 99285 EMERGENCY DEPT VISIT HI MDM: CPT

## 2022-11-03 PROCEDURE — 96374 THER/PROPH/DIAG INJ IV PUSH: CPT

## 2022-11-03 PROCEDURE — 71045 X-RAY EXAM CHEST 1 VIEW: CPT

## 2022-11-03 PROCEDURE — 85025 COMPLETE CBC W/AUTO DIFF WBC: CPT

## 2022-11-03 PROCEDURE — 80053 COMPREHEN METABOLIC PANEL: CPT

## 2022-11-03 PROCEDURE — 93005 ELECTROCARDIOGRAM TRACING: CPT | Performed by: EMERGENCY MEDICINE

## 2022-11-03 RX ORDER — LISINOPRIL 2.5 MG/1
2.5 TABLET ORAL DAILY
COMMUNITY

## 2022-11-03 RX ORDER — KETOROLAC TROMETHAMINE 30 MG/ML
15 INJECTION, SOLUTION INTRAMUSCULAR; INTRAVENOUS ONCE
Status: COMPLETED | OUTPATIENT
Start: 2022-11-03 | End: 2022-11-03

## 2022-11-03 RX ADMIN — KETOROLAC TROMETHAMINE 15 MG: 30 INJECTION, SOLUTION INTRAMUSCULAR; INTRAVENOUS at 19:50

## 2022-11-03 ASSESSMENT — PAIN DESCRIPTION - DESCRIPTORS: DESCRIPTORS: SHARP

## 2022-11-03 ASSESSMENT — PAIN SCALES - GENERAL
PAINLEVEL_OUTOF10: 6
PAINLEVEL_OUTOF10: 6

## 2022-11-03 ASSESSMENT — PAIN - FUNCTIONAL ASSESSMENT: PAIN_FUNCTIONAL_ASSESSMENT: 0-10

## 2022-11-03 ASSESSMENT — PAIN DESCRIPTION - LOCATION: LOCATION: CHEST

## 2022-11-04 LAB
EKG DIAGNOSIS: NORMAL
EKG Q-T INTERVAL: 442 MS
EKG QRS DURATION: 134 MS
EKG QTC CALCULATION (BAZETT): 477 MS
EKG R AXIS: 263 DEGREES
EKG T AXIS: 86 DEGREES
EKG VENTRICULAR RATE: 70 BPM

## 2022-11-04 PROCEDURE — 93010 ELECTROCARDIOGRAM REPORT: CPT | Performed by: INTERNAL MEDICINE

## 2022-11-04 NOTE — CARE COORDINATION
CM -Dr Idalia Duval is [place] --language line called at discharge --CM made sure that an emphasis was on a follow up with the Cardiologist Eduin Baum MD -phone [phone] at [place] [address]. --Call for an appointment tomorrow.

## 2022-11-04 NOTE — ED PROVIDER NOTES
Emergency Department Encounter    Patient: Marine Rodriguez  MRN: [id number]  : 1995  Date of Evaluation: 11/3/2022  ED Provider:  Danika Cuevas MD    Triage Chief Complaint:   Chest Pain (Chest pain x 1 month)    [place]:  Marine Rodriguez is a 32 y.o. female that presents complaint of chest pain for the last month with shortness of breath off and on. Had some cough and congestion initially. None in the last 2 weeks. No leg swelling or pain. She is on blood thinners. She has a history of a pacemaker as well as a valve replacement she tells me. She has a [employer] pacemaker, placed in 2019. She does not currently have a cardiologist in town, tells me she was seen here last year by them but had not followed up. Denies vomiting. Denies fevers. Denies diarrhea. Pain is 6 out of 10, not worse with inspiration. Had not taken anything for it.  [employer] #[id number]      Discharge summary from 2021 reviewed:  \"Hang Gonzalez is a 27 y.o.  female with past medical history of complete heart block s/p biventricular pacemaker placement, AV replacement with bioprosthetic aortic valve, severe mitral regurgitation, was admitted on 2021 for evaluation of chest pain. Seen by cardiology, unlikely cardiac in nature. Echocardiogram concerning for severe eccentric mitral regurgitation. Patient was seen by cardiothoracic surgery who recommended no surgical intervention at this time. Advise outpatient follow-up. Cardiology team recommends EP evaluation prior to discharge. As per ED evaluation, pacemaker adjustment for best electrical resynchronization vectors was done. Patient was also noted to be in persistent atrial fibrillation. [id number] score of 1, started on full dose aspirin. At this time, patient has no insurance, /social work aware. Outpatient medications being filled at the hospital.  Patient is currently being discharged in stable condition.      Discharge diagnosis     Atypical chest pain  Severe mitral regurgitation   status post bioprosthetic aortic valve  Persistent atrial fibrillation  Cardiomyopathy ischemic versus nonischemic  Complete heart block status post AICD placement\"      ROS - see HPI, below listed is current ROS at time of my eval:  10 systems reviewed negative except as above    History reviewed. No pertinent past medical history. Past Surgical History:   Procedure Laterality Date    CARDIAC SURGERY       History reviewed. No pertinent family history. Social History     Socioeconomic History    Marital status: Single     Spouse name: Not on file    Number of children: Not on file    Years of education: Not on file    Highest education level: Not on file   Occupational History    Not on file   Tobacco Use    Smoking status: Never    Smokeless tobacco: Never   Vaping Use    Vaping Use: Never used   Substance and Sexual Activity    Alcohol use: Never    Drug use: Never    Sexual activity: Yes   Other Topics Concern    Not on file   Social History Narrative    Not on file     Social Determinants of Health     Financial Resource Strain: Not on file   Food Insecurity: Not on file   Transportation Needs: Not on file   Physical Activity: Not on file   Stress: Not on file   Social Connections: Not on file   Intimate Partner Violence: Not on file   Housing Stability: Not on file     No current facility-administered medications for this encounter.      Current Outpatient Medications   Medication Sig Dispense Refill    lisinopril (PRINIVIL;ZESTRIL) 2.5 MG tablet Take 2.5 mg by mouth daily      aspirin 325 MG EC tablet Take 1 tablet by mouth daily 30 tablet 3    enalapril (VASOTEC) 10 MG tablet Take 1 tablet by mouth daily 30 tablet 3    metoprolol succinate (TOPROL XL) 25 MG extended release tablet Take 1 tablet by mouth daily 30 tablet 3    furosemide (LASIX) 20 MG tablet Take 1 tablet by mouth daily as needed (If weight increases by 2 lbs in a day or 5 lbs in a week) 30 tablet 3     No Known Allergies    Nursing Notes Reviewed    Physical Exam:  Triage VS:    ED Triage Vitals [11/03/22 1927]   Enc Vitals Group      /87      Heart Rate 70      Resp 20      Temp 98.4 °F (36.9 °C)      Temp Source Oral      SpO2 100 %      Weight 130 lb (59 kg)      Height       Head Circumference       Peak Flow       Pain Score       Pain Loc       Pain Edu? Excl. in [address]? My pulse ox interpretation is - normal    General appearance: Very anxious  Skin:  Warm. Dry. Eye:  Extraocular movements intact. Ears, nose, mouth and throat:  Oral mucosa moist   Neck:  Trachea midline. Extremity:  No swelling. Normal ROM     Heart:  Regular rate and rhythm, normal S1 & S2, no extra heart sounds. Perfusion:  intact  Respiratory:  Lungs clear to auscultation bilaterally. Respirations nonlabored. Abdominal:  Soft. Nontender. Non distended.   Neurological:  Alert and oriented            Psychiatric:  Appropriate    I have reviewed and interpreted all of the currently available lab results from this visit (if applicable):  Results for orders placed or performed during the hospital encounter of 11/03/22   CBC with Auto Differential   Result Value Ref Range    WBC 8.3 4.0 - 10.5 K/CU MM    RBC 4.53 4.2 - 5.4 M/CU MM    Hemoglobin 13.7 12.5 - 16.0 GM/DL    Hematocrit 39.7 37 - 47 %    MCV 87.6 78 - 100 FL    MCH 30.2 27 - 31 PG    MCHC 34.5 32.0 - 36.0 %    RDW 12.1 11.7 - 14.9 %    Platelets 908 460 - 104 K/CU MM    MPV 10.3 7.5 - 11.1 FL    Differential Type AUTOMATED DIFFERENTIAL     Segs Relative 54.2 36 - 66 %    Lymphocytes % 37.2 24 - 44 %    Monocytes % 7.5 (H) 0 - 4 %    Eosinophils % 0.8 0 - 3 %    Basophils % 0.2 0 - 1 %    Segs Absolute 4.5 K/CU MM    Lymphocytes Absolute 3.1 K/CU MM    Monocytes Absolute 0.6 K/CU MM    Eosinophils Absolute 0.1 K/CU MM    Basophils Absolute 0.0 K/CU MM    Nucleated RBC % 0.0 %    Total Nucleated RBC 0.0 K/CU MM    Total Immature Neutrophil 0.01 K/CU MM    Immature Neutrophil % 0.1 0 - 0.43 %   Comprehensive Metabolic Panel   Result Value Ref Range    Sodium 137 135 - 145 MMOL/L    Potassium 3.8 3.5 - 5.1 MMOL/L    Chloride 102 99 - 110 mMol/L    CO2 22 21 - 32 MMOL/L    BUN 13 6 - 23 MG/DL    Creatinine 0.6 0.6 - 1.1 MG/DL    Est, Glom Filt Rate >60 >60 mL/min/1.73m2    Glucose 114 (H) 70 - 99 MG/DL    Calcium 9.8 8.3 - 10.6 MG/DL    Albumin 4.5 3.4 - 5.0 GM/DL    Total Protein 7.8 6.4 - 8.2 GM/DL    Total Bilirubin 0.4 0.0 - 1.0 MG/DL    ALT 13 10 - 40 U/L    AST 20 15 - 37 IU/L    Alkaline Phosphatase 66 40 - 129 IU/L    Anion Gap 13 4 - 16   Troponin   Result Value Ref Range    Troponin T <0.010 <0.01 NG/ML      Radiographs (if obtained):  Radiologist's Report Reviewed:  XR CHEST PORTABLE    Result Date: 11/3/2022  EXAMINATION: ONE XRAY VIEW OF THE CHEST 11/3/2022 7:46 pm COMPARISON: November 30, 2021. HISTORY: ORDERING SYSTEM PROVIDED HISTORY: chest pain TECHNOLOGIST PROVIDED HISTORY: Reason for exam:->chest pain Reason for Exam: chest pain Additional signs and symptoms: NA Relevant Medical/Surgical History: NA FINDINGS: Frontal portable view of the chest.  Normal lung volume. No focal airspace disease. Normal pulmonary vasculature. No pleural effusion or pneumothorax. Stable cardiomediastinal silhouette and great vessels. Stable regional skeleton. Left pectoral trans venous cardiac pacer/ICD. Prior median sternotomy. No acute cardiopulmonary process. No focal airspace disease. EKG (if obtained): (All EKG's are interpreted by myself in the absence of a cardiologist)  Ventricular paced rhythm with a rate of 70 bpm with right bundle branch block. Similar compared to previous on 12/15/2021      MDM:  49-year-old female with history as above presents with complaint of chest pain or shortness of breath for over a month. She is not in distress other than seemingly anxious.   Her vitals are normal.  Plan for labs, imaging and reassess. Given Toradol with improvement of her chest pain. Her chest x-ray is clear, her troponin is normal, her electrolytes are normal, her hemoglobin is stable. She will require follow-up with her cardiologist given her significant medical history, however does not require hospitalization at this time. Plan will be for discharge, case management has been consulted to help with outpatient follow up. Clinical Impression:  1. Chest pain, unspecified type      Disposition referral (if applicable):  Maryuri Munguia MD  [address]  [phone]        Disposition medications (if applicable):  New Prescriptions    No medications on file     ED Provider Disposition Time  DISPOSITION Discharge - Pending Orders Complete 11/03/2022 10:05:58 PM      Comment: Please note this report has been produced using speech recognition software and may contain errors related to that system including errors in grammar, punctuation, and spelling, as well as words and phrases that may be inappropriate. Efforts were made to edit the dictations.         Jovany Jain MD  11/03/22 [id number]

## 2022-11-15 ENCOUNTER — OFFICE VISIT (OUTPATIENT)
Dept: CARDIOLOGY CLINIC | Age: 27
End: 2022-11-15

## 2022-11-15 VITALS
HEIGHT: 64 IN | BODY MASS INDEX: 22.47 KG/M2 | DIASTOLIC BLOOD PRESSURE: 62 MMHG | HEART RATE: 72 BPM | SYSTOLIC BLOOD PRESSURE: 126 MMHG

## 2022-11-15 DIAGNOSIS — I48.21 PERMANENT ATRIAL FIBRILLATION (HCC): ICD-10-CM

## 2022-11-15 DIAGNOSIS — I34.0 NONRHEUMATIC MITRAL VALVE REGURGITATION: Primary | ICD-10-CM

## 2022-11-15 DIAGNOSIS — R07.9 CHEST PAIN, UNSPECIFIED TYPE: ICD-10-CM

## 2022-11-15 PROCEDURE — 99214 OFFICE O/P EST MOD 30 MIN: CPT | Performed by: INTERNAL MEDICINE

## 2022-11-15 NOTE — PROGRESS NOTES
Ángel Herman MD                                  CARDIOLOGY  NOTE        Chief Complaint:    Chief Complaint   Patient presents with    1 Year Follow Up     Pain in chest during day when tired   No caffeine          Patient recently went to the ER with complaints of chest pain. Chest pain was reproducible to touch, after initial work-up she was discharged home for cardiology follow-up    EKG at the hospital showed BiV paced rhythm with underlying A. fib      HPI:     Paulette Renteria is a 32y.o. year old female who was recently evaluated in the hospital.    Patient is from [place] , does not speak [place]. She has prior medical history significant for aortic valve replacement bioprosthetic 2018. Patient also has underlying permanent atrial fibrillation and complete heart block s/p BiV    Patient presents as a follow-up from hospital  She complains of intermittent chest pains. Current Outpatient Medications   Medication Sig Dispense Refill    lisinopril (PRINIVIL;ZESTRIL) 2.5 MG tablet Take 2.5 mg by mouth daily      aspirin 325 MG EC tablet Take 1 tablet by mouth daily 30 tablet 3    enalapril (VASOTEC) 10 MG tablet Take 1 tablet by mouth daily 30 tablet 3    metoprolol succinate (TOPROL XL) 25 MG extended release tablet Take 1 tablet by mouth daily 30 tablet 3    furosemide (LASIX) 20 MG tablet Take 1 tablet by mouth daily as needed (If weight increases by 2 lbs in a day or 5 lbs in a week) 30 tablet 3     No current facility-administered medications for this visit. Allergies:     Patient has no known allergies. Patient History:    No past medical history on file. Past Surgical History:   Procedure Laterality Date    CARDIAC SURGERY       No family history on file.   Social History     Tobacco Use    Smoking status: Never    Smokeless tobacco: Never   Substance Use Topics    Alcohol use: Never        Review of Systems:     Constitutional:  No Fever or Weight Loss   Eyes: No Decreased Vision  ENT: No Headaches, Hearing Loss or Vertigo  Cardiovascular: No Chest Pain,  No Shortness of breath, No Palpitations. No Edema   Respiratory: No cough or wheezing . No Respiratory distress   Gastrointestinal: No abdominal pain, appetite loss, blood in stools, constipation, diarrhea or heartburn  Genitourinary: No dysuria, trouble voiding, or hematuria  Musculoskeletal:  denies any new  joint aches , or pain   Integumentary: No rash or pruritis  Neurological: No TIA or stroke symptoms  Psychiatric: No anxiety or depression  Endocrine: No malaise, fatigue or temperature intolerance  Hematologic/Lymphatic: No bleeding problems, blood clots or swollen lymph nodes  Allergic/Immunologic: No nasal congestion or hives        Objective:      Physical Exam:    /62 (Site: Left Upper Arm, Position: Sitting, Cuff Size: Medium Adult)   Pulse 72   Ht 5' 3.78\" (1.62 m)   BMI 22.47 kg/m²   Wt Readings from Last 3 Encounters:   11/03/22 130 lb (59 kg)   05/15/22 114 lb (51.7 kg)   12/15/21 114 lb (51.7 kg)     Body mass index is 22.47 kg/m². Vitals:    11/15/22 1441   BP: 126/62   Pulse: 72        General Appearance and Constitutional: Conversant, Well developed, Well nourished, No acute distress, Non-toxic appearance. HEENT:  Normocephalic, Atraumatic, Bilateral external ears normal, Oropharynx moist, No oral exudates,   Nose normal.   Neck- Normal range of motion, No tenderness, Supple  Eyes:  EOMI, Conjunctiva normal, No discharge. Respiratory:  Normal breath sounds, No respiratory distress, No wheezing, No Rales, No Ronchi. No chest tenderness. Cardiovascular: S1-S2, no added heart sounds, No Mumurs appreciated. No gallops, rubs. No Pedal Edema   GI:  Bowel sounds normal, Soft, No tenderness,  :  No costovertebral angle tenderness   Musculoskeletal:  No gross deformities.  Back- No tenderness  Integument:  Well hydrated, no rash   Lymphatic:  No lymphadenopathy noted   Neurologic:  Alert & oriented x 3, Normal motor function, normal sensory function, no focal deficits noted   Psychiatric:  Speech and behavior appropriate       Medical decision making and Data review:    DATA:    Lab Results   Component Value Date    TROPONINT <0.010 11/03/2022     BNP:    Lab Results   Component Value Date    PROBNP 256.5 11/30/2021     PT/INR:  No results found for: PTINR  No results found for: LABA1C  No results found for: CHOL, TRIG, HDL, LDLCALC, LDLDIRECT  Lab Results   Component Value Date    WBC 8.3 11/03/2022    HGB 13.7 11/03/2022    HCT 39.7 11/03/2022    MCV 87.6 11/03/2022     11/03/2022     TSH: No results found for: TSH  Lab Results   Component Value Date    AST 20 11/03/2022    ALT 13 11/03/2022    BILITOT 0.4 11/03/2022    ALKPHOS 66 11/03/2022         All labs, medications and tests reviewed by myself including data and history from outside source , patient and available family . 1. Nonrheumatic mitral valve regurgitation    2. Permanent atrial fibrillation (HCC)    3. Chest pain, unspecified type           Impression and Plan:      Noncardiac chest pain : Reproducible on touch. No ischemic work-up planned. Patient also has epigastric discomfort and tenderness. EKG shows atrial sensed ventricular paced rhythm. History of aortic valve replacement, bioprosthetic in 2018 in [place]-  recommend aspirin. Severe posteriorly directed mitral regurgitation with tethering of anterior mitral leaflet ? Postop -  Pt evaluated by CVT surgelatosha - Dr Sander Doll in hospital . No surgical intervention indicated currently  - Cont Low dose Lasix PRN      Perm. Afib   CHB  ? CMP S/p BiV      [employer] device which was interrogated in hospital. Patient in persistent AF since September 30th 2021. She remains V-paced. Pt also evaluated by Dr. Shalini Kelley 1 for sex. No documented HTN.   Unclear circumstances leading to BIV and not PM. EF is fairly normal  Continue with enalapril 10 mg daily  Continue with Toprol-XL 25 daily  Continue with Lasix 20 daily  Recommend  mg instead of [facility] at this time    Will interrogate ICD today  Outpatient follow-up    Return in about 1 year (around 11/15/2023). Counseled extensively and medication compliance urged. We discussed that for the  prevention of ASCVD our  goal is aggressive risk modification. Patient is encouraged to exercise even a brisk walk for 30 minutes  at least 3 to 4 times a week   Various goals were discussed and questions answered. Continue current medications. Appropriate prescriptions are addressed and refills ordered. Questions answered and patient verbalizes understanding. Call for any problems, questions, or concerns.

## 2023-02-24 ENCOUNTER — TELEPHONE (OUTPATIENT)
Dept: CARDIOLOGY CLINIC | Age: 28
End: 2023-02-24

## 2023-02-24 NOTE — TELEPHONE ENCOUNTER
Called patient to advise her that she needs to contact prior cardiologist to release her in the [facility]. Patient was with  and he was able to take down all information for patient.

## 2023-02-24 NOTE — TELEPHONE ENCOUNTER
Spoke with patient and spouse and patient has not been taking her Metoprolol and needs a refill on metoprolol succinate 25 mg 1 tab daily sent to [facility] in [facility].

## 2023-03-06 RX ORDER — METOPROLOL SUCCINATE 25 MG/1
25 TABLET, EXTENDED RELEASE ORAL DAILY
Qty: 30 TABLET | Refills: 3 | Status: SHIPPED | OUTPATIENT
Start: 2023-03-06

## 2024-01-29 ENCOUNTER — HOSPITAL ENCOUNTER (OUTPATIENT)
Age: 29
Setting detail: SPECIMEN
Discharge: HOME | End: 2024-01-29

## 2024-01-29 ENCOUNTER — OFFICE VISIT (OUTPATIENT)
Dept: OBGYN | Age: 29
End: 2024-01-29

## 2024-01-29 VITALS
SYSTOLIC BLOOD PRESSURE: 126 MMHG | HEART RATE: 69 BPM | BODY MASS INDEX: 19.88 KG/M2 | WEIGHT: 115 LBS | DIASTOLIC BLOOD PRESSURE: 85 MMHG

## 2024-01-29 DIAGNOSIS — Z95.0 PACEMAKER: Primary | ICD-10-CM

## 2024-01-29 DIAGNOSIS — Z32.01 POSITIVE PREGNANCY TEST: ICD-10-CM

## 2024-01-29 DIAGNOSIS — Z01.419 WELL WOMAN EXAM WITH ROUTINE GYNECOLOGICAL EXAM: ICD-10-CM

## 2024-01-29 DIAGNOSIS — N92.6 IRREGULAR MENSES: ICD-10-CM

## 2024-01-29 DIAGNOSIS — R31.9 HEMATURIA, UNSPECIFIED TYPE: ICD-10-CM

## 2024-01-29 DIAGNOSIS — Z76.89 ENCOUNTER TO ESTABLISH CARE: ICD-10-CM

## 2024-01-29 LAB
BACTERIA URNS QL MICRO: NORMAL /HPF
BILIRUB UR QL STRIP.AUTO: NEGATIVE
CLARITY UR: CLEAR
COLOR UR: YELLOW
CONTROL: NORMAL
EPI CELLS #/AREA URNS AUTO: 1 /HPF (ref 0–5)
GLUCOSE UR STRIP.AUTO-MCNC: NEGATIVE MG/DL
HGB UR QL STRIP.AUTO: NEGATIVE
HYALINE CASTS #/AREA URNS AUTO: 0 /LPF (ref 0–8)
KETONES UR STRIP.AUTO-MCNC: NEGATIVE MG/DL
LEUKOCYTE ESTERASE UR QL STRIP.AUTO: NEGATIVE
NITRITE UR QL STRIP.AUTO: NEGATIVE
PH UR STRIP.AUTO: 7 [PH] (ref 5–8)
PREGNANCY TEST URINE, POC: POSITIVE
PROT UR STRIP.AUTO-MCNC: NEGATIVE MG/DL
RBC CLUMPS #/AREA URNS AUTO: 0 /HPF (ref 0–4)
SP GR UR STRIP.AUTO: 1.01 (ref 1–1.03)
UA DIPSTICK W REFLEX MICRO PNL UR: NORMAL
URN SPEC COLLECT METH UR: NORMAL
UROBILINOGEN UR STRIP-ACNC: 0.2 E.U./DL
WBC #/AREA URNS AUTO: 0 /HPF (ref 0–5)

## 2024-01-29 PROCEDURE — 88142 CYTOPATH C/V THIN LAYER: CPT

## 2024-01-29 PROCEDURE — 87801 DETECT AGNT MULT DNA AMPLI: CPT

## 2024-01-29 PROCEDURE — 36415 COLL VENOUS BLD VENIPUNCTURE: CPT | Performed by: OBSTETRICS & GYNECOLOGY

## 2024-01-29 PROCEDURE — 87624 HPV HI-RISK TYP POOLED RSLT: CPT

## 2024-01-29 PROCEDURE — 81025 URINE PREGNANCY TEST: CPT | Performed by: OBSTETRICS & GYNECOLOGY

## 2024-01-29 PROCEDURE — 99385 PREV VISIT NEW AGE 18-39: CPT | Performed by: OBSTETRICS & GYNECOLOGY

## 2024-01-29 ASSESSMENT — PATIENT HEALTH QUESTIONNAIRE - PHQ9
SUM OF ALL RESPONSES TO PHQ QUESTIONS 1-9: 0
2. FEELING DOWN, DEPRESSED OR HOPELESS: 0
SUM OF ALL RESPONSES TO PHQ9 QUESTIONS 1 & 2: 0
1. LITTLE INTEREST OR PLEASURE IN DOING THINGS: 0
SUM OF ALL RESPONSES TO PHQ QUESTIONS 1-9: 0

## 2024-01-30 ENCOUNTER — TELEPHONE (OUTPATIENT)
Dept: CARDIOLOGY CLINIC | Age: 29
End: 2024-01-30

## 2024-01-30 LAB
B-HCG SERPL EIA 3RD IS-ACNC: NORMAL MIU/ML
BACTERIA UR CULT: NORMAL

## 2024-01-30 NOTE — TELEPHONE ENCOUNTER
Left message for patient requesting a return call to schedule an office visit with current Barbie Keene speaking patient for pacemaker and encounter to establish care per referral from Lex George.

## 2024-01-31 NOTE — PROGRESS NOTES
24    Hang Gee  1995    Chief Complaint   Patient presents with    New Patient     Annual exam, LMP 23, sexually active, states she is pregnant, patient unable to void for office urine pregnancy tesy, pap unsure, complains of cramping and bleeding that started last week . Patient had pacemaker placed  . Is not under the care of cardiologist or Physician since [facility] ED follow up appointment 2022 . States she has an appointment with a cardiologist in [place], [place] . Doesn't know physicians name, and unaware of who referred her there.  [id number]        Hang Gee is a 28 y.o. female who presents today for evaluation of see above.    Past Medical History:   Diagnosis Date    A-fib (HCC)     Fibroid     History of complete heart block     Irregular menses     Nonrheumatic mitral valve regurgitation        Past Surgical History:   Procedure Laterality Date    AORTIC VALVE REPLACEMENT  2018    bioprosthetic    CARDIAC SURGERY      PACEMAKER INSERTION      THERAPEUTIC       in [facility]  around 18weeks due to heart disease       Social History     Tobacco Use    Smoking status: Never    Smokeless tobacco: Never   Vaping Use    Vaping Use: Never used   Substance Use Topics    Alcohol use: Never    Drug use: Never       No family history on file.    Current Outpatient Medications   Medication Sig Dispense Refill    aspirin 325 MG EC tablet Take 1 tablet by mouth daily 30 tablet 3    metoprolol succinate (TOPROL XL) 25 MG extended release tablet Take 1 tablet by mouth daily (Patient not taking: Reported on 2024) 30 tablet 3    lisinopril (PRINIVIL;ZESTRIL) 2.5 MG tablet Take 2.5 mg by mouth daily (Patient not taking: Reported on 2024)      enalapril (VASOTEC) 10 MG tablet Take 1 tablet by mouth daily (Patient not taking: Reported on 2024) 30 tablet 3    furosemide (LASIX) 20 MG tablet Take 1 tablet by mouth daily as needed (If weight increases by 2 lbs in a day or 5 lbs

## 2024-02-02 LAB
C TRACH RRNA SPEC QL NAA+PROBE: NEGATIVE
N GONORRHOEA RRNA SPEC QL NAA+PROBE: NEGATIVE

## 2024-02-03 LAB
HPV HIGH RISK: NOT DETECTED
HPV, GENOTYPE 16: NOT DETECTED
HPV, GENOTYPE 18: NOT DETECTED

## 2024-02-07 ENCOUNTER — TELEPHONE (OUTPATIENT)
Dept: CARDIOLOGY CLINIC | Age: 29
End: 2024-02-07

## 2024-02-09 ENCOUNTER — APPOINTMENT (OUTPATIENT)
Dept: ULTRASOUND IMAGING | Age: 29
End: 2024-02-09

## 2024-02-09 ENCOUNTER — HOSPITAL ENCOUNTER (OUTPATIENT)
Age: 29
Discharge: HOME | End: 2024-02-09
Attending: OBSTETRICS & GYNECOLOGY | Admitting: OBSTETRICS & GYNECOLOGY
Payer: COMMERCIAL

## 2024-02-09 VITALS
TEMPERATURE: 98.6 F | RESPIRATION RATE: 16 BRPM | OXYGEN SATURATION: 100 % | HEART RATE: 69 BPM | SYSTOLIC BLOOD PRESSURE: 142 MMHG | DIASTOLIC BLOOD PRESSURE: 90 MMHG

## 2024-02-09 PROBLEM — R58 BLEEDING: Status: ACTIVE | Noted: 2024-02-09

## 2024-02-09 LAB
ABO/RH: NORMAL
ANTIBODY SCREEN: NEGATIVE
GONADOTROPIN, CHORIONIC (HCG) QUANT: NORMAL UIU/ML
HCT VFR BLD CALC: 40 % (ref 37–47)
HEMOGLOBIN: 13.4 GM/DL (ref 12.5–16)
MCH RBC QN AUTO: 30.5 PG (ref 27–31)
MCHC RBC AUTO-ENTMCNC: 33.5 % (ref 32–36)
MCV RBC AUTO: 90.9 FL (ref 78–100)
PDW BLD-RTO: 12.6 % (ref 11.7–14.9)
PLATELET # BLD: 167 K/CU MM (ref 140–440)
PMV BLD AUTO: 9.9 FL (ref 7.5–11.1)
RBC # BLD: 4.4 M/CU MM (ref 4.2–5.4)
WBC # BLD: 5.5 K/CU MM (ref 4–10.5)

## 2024-02-09 PROCEDURE — 76801 OB US < 14 WKS SINGLE FETUS: CPT

## 2024-02-09 PROCEDURE — 99214 OFFICE O/P EST MOD 30 MIN: CPT

## 2024-02-09 PROCEDURE — 86901 BLOOD TYPING SEROLOGIC RH(D): CPT

## 2024-02-09 PROCEDURE — 84702 CHORIONIC GONADOTROPIN TEST: CPT

## 2024-02-09 PROCEDURE — 6370000000 HC RX 637 (ALT 250 FOR IP): Performed by: OBSTETRICS & GYNECOLOGY

## 2024-02-09 PROCEDURE — 86900 BLOOD TYPING SEROLOGIC ABO: CPT

## 2024-02-09 PROCEDURE — 93975 VASCULAR STUDY: CPT

## 2024-02-09 PROCEDURE — 99213 OFFICE O/P EST LOW 20 MIN: CPT

## 2024-02-09 PROCEDURE — 76817 TRANSVAGINAL US OBSTETRIC: CPT

## 2024-02-09 PROCEDURE — 85027 COMPLETE CBC AUTOMATED: CPT

## 2024-02-09 PROCEDURE — 86850 RBC ANTIBODY SCREEN: CPT

## 2024-02-09 RX ORDER — IBUPROFEN 800 MG/1
800 TABLET ORAL ONCE
Status: COMPLETED | OUTPATIENT
Start: 2024-02-09 | End: 2024-02-09

## 2024-02-09 RX ADMIN — IBUPROFEN 800 MG: 800 TABLET, FILM COATED ORAL at 20:33

## 2024-02-09 ASSESSMENT — PAIN SCALES - GENERAL: PAINLEVEL_OUTOF10: 0

## 2024-02-09 NOTE — PROGRESS NOTES
Department of Obstetrics and Gynecology  Labor and Delivery  TRIAGE NOTE      SUBJECTIVE:    Chief Complaint   Patient presents with    Spotting    Vaginal Bleeding    small clots     Pt here for vaginal bleeding and passing small clots.     OBJECTIVE    Vitals:  /78   Pulse 69   Temp 98.6 °F (37 °C) (Oral)   Resp 16   LMP 2023       CONSTITUTIONAL:  negative  RESPIRATORY:  negative  CARDIOVASCULAR:  negative  GASTROINTESTINAL:  negative  ALLERGIC/IMMUNOLOGIC:  negative  NEUROLOGICAL:  negative  BEHAVIOR/PSYCH:  negative    Cervix:    Deferred at this time     ASSESSMENT:     28 y.o.  year old  at 9w1d  with 2024, by Last Menstrual Period  Hx of Pacemaker, and pretty complex cardiac history. Including aortic valve replacement bioprosthetic , A fib, complete heart block Does have a referral to see Dr Keene outpatient     Pt here for vaginal bleeding and clots. Denies any pain at this time.   Was seen in office  and had a HCG drawn that was 71478.has no had an ultrasound at this time.   Unable to a urine at this time pt states she doesn't have to go to the bathroom .  Type and screen ordered.   OB transvaginal ordered.   Will also plan to repeat HCG and cbc pending ultrasound results.       PLAN:  Dispo pending ultrasound and labs.           Michaela Hardwick, ALEICA - GRACIELA

## 2024-02-09 NOTE — FLOWSHEET NOTE
1535- pt to Triage 2 for c/o vaginal bleeding and passing small clots. CNM notified of pt on unit.     1610- CNM reviewed chart, rec'd ordered for T&S and US     1720- to US via wheelchair    1825- pt returned to room 2 after US via wheelchair. Labs drawn as ordered .

## 2024-02-10 NOTE — FLOWSHEET NOTE
Dr Yan calls unit with orders to discharge patient with bleeding precautions, administer 800mg Motrin now, and call [facility] to schedule a follow up appointment in the next 1-2 weeks.

## 2024-02-10 NOTE — DISCHARGE INSTRUCTIONS
Miscarriage: After Your Visit   Your Care Instructions     The loss of a pregnancy can be very hard. You may wonder why it happened or blame yourself. Miscarriages are common and are not caused by exercise, stress, or sex. Most happen because the fertilized egg in the uterus does not develop normally.   There is no treatment that can stop a miscarriage. As long as you do not have heavy blood loss, fever, weakness, or other signs of infection, you can let a miscarriage follow its own course. This can take several days.   Your body will recover over the next several weeks. Having a miscarriage does not mean you cannot have a normal pregnancy in the future. In the weeks to come, try to take care of yourself physically and emotionally.     Make a follow-up appointment for 1-2 weeks with [facility]  Follow-up care is a key part of your treatment and safety. Be sure to make and go to all appointments, and call your doctor if you are having problems. It's also a good idea to know your test results and keep a list of the medicines you take.     The doctor has checked you carefully, but problems can develop later. If you notice any problems or new symptoms, get medical treatment right away.     How can you care for yourself at home?   You will probably have some vaginal bleeding for 1 to 2 weeks. It may be similar to or slightly heavier than a normal period. The bleeding should get lighter after a week. Use pads instead of tampons. You may use tampons during your next period, which should start in 3 to 6 weeks.    Use a clear container to save any tissue that you pass. Take it to your doctor's office as soon as you can.   Do not have sex until the bleeding stops.   You may return to your normal activities if you feel well enough to do so. But you should avoid heavy exercise until the bleeding stops.   If you plan to get pregnant again, check with your doctor. Most doctors suggest waiting until you have had at least

## 2024-02-10 NOTE — FLOWSHEET NOTE
AVS and discharge instructions reviewed with patient. All questions answered. Pt escorted off of unit by this RN in a wheelchair in stable condition with all belongings. Pt instructed to return to facility or notify physician with changes in status.

## 2024-02-10 NOTE — CONSULTS
Session ID: [id number]  Language: Russian Creole   ID: #[id number]   Name: Kelley    Patient advised of results of lab work and US. All questions answered. Motrin 800mg administered per MAR.

## 2024-02-12 ENCOUNTER — TELEPHONE (OUTPATIENT)
Dept: CARDIOLOGY CLINIC | Age: 29
End: 2024-02-12

## 2024-02-14 ENCOUNTER — APPOINTMENT (OUTPATIENT)
Dept: CT IMAGING | Age: 29
End: 2024-02-14
Payer: COMMERCIAL

## 2024-02-14 ENCOUNTER — HOSPITAL ENCOUNTER (EMERGENCY)
Age: 29
Discharge: HOME | End: 2024-02-14
Attending: EMERGENCY MEDICINE
Payer: COMMERCIAL

## 2024-02-14 VITALS
TEMPERATURE: 97.8 F | WEIGHT: 109 LBS | DIASTOLIC BLOOD PRESSURE: 99 MMHG | HEART RATE: 70 BPM | OXYGEN SATURATION: 100 % | BODY MASS INDEX: 18.84 KG/M2 | SYSTOLIC BLOOD PRESSURE: 144 MMHG | RESPIRATION RATE: 24 BRPM

## 2024-02-14 DIAGNOSIS — J11.1 INFLUENZA WITH RESPIRATORY MANIFESTATION OTHER THAN PNEUMONIA: ICD-10-CM

## 2024-02-14 DIAGNOSIS — R51.9 ACUTE NONINTRACTABLE HEADACHE, UNSPECIFIED HEADACHE TYPE: ICD-10-CM

## 2024-02-14 DIAGNOSIS — R10.84 GENERALIZED ABDOMINAL PAIN: Primary | ICD-10-CM

## 2024-02-14 LAB
ALBUMIN SERPL-MCNC: 4.2 GM/DL (ref 3.4–5)
ALP BLD-CCNC: 48 IU/L (ref 40–129)
ALT SERPL-CCNC: 9 U/L (ref 10–40)
ANION GAP SERPL CALCULATED.3IONS-SCNC: 13 MMOL/L (ref 7–16)
AST SERPL-CCNC: 18 IU/L (ref 15–37)
BASOPHILS ABSOLUTE: 0 K/CU MM
BASOPHILS RELATIVE PERCENT: 0.1 % (ref 0–1)
BILIRUB SERPL-MCNC: 0.4 MG/DL (ref 0–1)
BILIRUBIN URINE: NEGATIVE MG/DL
BLOOD, URINE: ABNORMAL
BUN SERPL-MCNC: 6 MG/DL (ref 6–23)
CALCIUM SERPL-MCNC: 8.6 MG/DL (ref 8.3–10.6)
CHLORIDE BLD-SCNC: 101 MMOL/L (ref 99–110)
CLARITY: CLEAR
CO2: 21 MMOL/L (ref 21–32)
COLOR: YELLOW
CREAT SERPL-MCNC: 0.4 MG/DL (ref 0.6–1.1)
DIFFERENTIAL TYPE: ABNORMAL
EOSINOPHILS ABSOLUTE: 0 K/CU MM
EOSINOPHILS RELATIVE PERCENT: 0.4 % (ref 0–3)
GFR SERPL CREATININE-BSD FRML MDRD: >60 ML/MIN/1.73M2
GLUCOSE SERPL-MCNC: 92 MG/DL (ref 70–99)
GLUCOSE, URINE: NEGATIVE MG/DL
GONADOTROPIN, CHORIONIC (HCG) QUANT: 2532 UIU/ML
HCT VFR BLD CALC: 37.7 % (ref 37–47)
HEMOGLOBIN: 12.6 GM/DL (ref 12.5–16)
IMMATURE NEUTROPHIL %: 0.3 % (ref 0–0.43)
INFLUENZA A ANTIGEN: NOT DETECTED
INFLUENZA B ANTIGEN: DETECTED
KETONES, URINE: 15 MG/DL
LEUKOCYTE ESTERASE, URINE: NEGATIVE
LIPASE: 10 IU/L (ref 13–60)
LYMPHOCYTES ABSOLUTE: 2.7 K/CU MM
LYMPHOCYTES RELATIVE PERCENT: 39.7 % (ref 24–44)
MCH RBC QN AUTO: 30.1 PG (ref 27–31)
MCHC RBC AUTO-ENTMCNC: 33.4 % (ref 32–36)
MCV RBC AUTO: 90.2 FL (ref 78–100)
MONOCYTES ABSOLUTE: 0.4 K/CU MM
MONOCYTES RELATIVE PERCENT: 5.2 % (ref 0–4)
NITRITE URINE, QUANTITATIVE: NEGATIVE
NUCLEATED RBC %: 0 %
PDW BLD-RTO: 12.5 % (ref 11.7–14.9)
PH, URINE: 6 (ref 5–8)
PLATELET # BLD: 209 K/CU MM (ref 140–440)
PMV BLD AUTO: 10.3 FL (ref 7.5–11.1)
POTASSIUM SERPL-SCNC: 4.4 MMOL/L (ref 3.5–5.1)
PROTEIN UA: NEGATIVE MG/DL
RBC # BLD: 4.18 M/CU MM (ref 4.2–5.4)
SARS-COV-2 RDRP RESP QL NAA+PROBE: NOT DETECTED
SEGMENTED NEUTROPHILS ABSOLUTE COUNT: 3.6 K/CU MM
SEGMENTED NEUTROPHILS RELATIVE PERCENT: 54.3 % (ref 36–66)
SODIUM BLD-SCNC: 135 MMOL/L (ref 135–145)
SOURCE: NORMAL
SPECIFIC GRAVITY UA: 1.02 (ref 1–1.03)
TOTAL IMMATURE NEUTOROPHIL: 0.02 K/CU MM
TOTAL NUCLEATED RBC: 0 K/CU MM
TOTAL PROTEIN: 7.7 GM/DL (ref 6.4–8.2)
UROBILINOGEN, URINE: 0.2 MG/DL (ref 0.2–1)
WBC # BLD: 6.7 K/CU MM (ref 4–10.5)

## 2024-02-14 PROCEDURE — 6360000004 HC RX CONTRAST MEDICATION: Performed by: EMERGENCY MEDICINE

## 2024-02-14 PROCEDURE — 2580000003 HC RX 258: Performed by: EMERGENCY MEDICINE

## 2024-02-14 PROCEDURE — 84702 CHORIONIC GONADOTROPIN TEST: CPT

## 2024-02-14 PROCEDURE — 96375 TX/PRO/DX INJ NEW DRUG ADDON: CPT

## 2024-02-14 PROCEDURE — 87635 SARS-COV-2 COVID-19 AMP PRB: CPT

## 2024-02-14 PROCEDURE — 83690 ASSAY OF LIPASE: CPT

## 2024-02-14 PROCEDURE — 85025 COMPLETE CBC W/AUTO DIFF WBC: CPT

## 2024-02-14 PROCEDURE — 80053 COMPREHEN METABOLIC PANEL: CPT

## 2024-02-14 PROCEDURE — 87502 INFLUENZA DNA AMP PROBE: CPT

## 2024-02-14 PROCEDURE — 96374 THER/PROPH/DIAG INJ IV PUSH: CPT

## 2024-02-14 PROCEDURE — 6360000002 HC RX W HCPCS: Performed by: EMERGENCY MEDICINE

## 2024-02-14 PROCEDURE — 99285 EMERGENCY DEPT VISIT HI MDM: CPT

## 2024-02-14 PROCEDURE — 81001 URINALYSIS AUTO W/SCOPE: CPT

## 2024-02-14 PROCEDURE — 87086 URINE CULTURE/COLONY COUNT: CPT

## 2024-02-14 PROCEDURE — 74177 CT ABD & PELVIS W/CONTRAST: CPT

## 2024-02-14 RX ORDER — METOCLOPRAMIDE HYDROCHLORIDE 5 MG/ML
10 INJECTION INTRAMUSCULAR; INTRAVENOUS ONCE
Status: COMPLETED | OUTPATIENT
Start: 2024-02-14 | End: 2024-02-14

## 2024-02-14 RX ORDER — NAPROXEN 500 MG/1
500 TABLET ORAL 2 TIMES DAILY WITH MEALS
Qty: 60 TABLET | Refills: 0 | Status: SHIPPED | OUTPATIENT
Start: 2024-02-14

## 2024-02-14 RX ORDER — DIPHENHYDRAMINE HYDROCHLORIDE 50 MG/ML
25 INJECTION INTRAMUSCULAR; INTRAVENOUS ONCE
Status: COMPLETED | OUTPATIENT
Start: 2024-02-14 | End: 2024-02-14

## 2024-02-14 RX ORDER — ONDANSETRON 4 MG/1
4 TABLET, ORALLY DISINTEGRATING ORAL 3 TIMES DAILY PRN
Qty: 21 TABLET | Refills: 0 | Status: SHIPPED | OUTPATIENT
Start: 2024-02-14

## 2024-02-14 RX ORDER — KETOROLAC TROMETHAMINE 15 MG/ML
30 INJECTION, SOLUTION INTRAMUSCULAR; INTRAVENOUS ONCE
Status: COMPLETED | OUTPATIENT
Start: 2024-02-14 | End: 2024-02-14

## 2024-02-14 RX ORDER — 0.9 % SODIUM CHLORIDE 0.9 %
1000 INTRAVENOUS SOLUTION INTRAVENOUS ONCE
Status: COMPLETED | OUTPATIENT
Start: 2024-02-14 | End: 2024-02-14

## 2024-02-14 RX ORDER — GUAIFENESIN AND DEXTROMETHORPHAN HYDROBROMIDE 600; 30 MG/1; MG/1
1 TABLET, EXTENDED RELEASE ORAL 2 TIMES DAILY
Qty: 28 TABLET | Refills: 0 | Status: SHIPPED | OUTPATIENT
Start: 2024-02-14

## 2024-02-14 RX ORDER — SODIUM CHLORIDE 0.9 % (FLUSH) 0.9 %
5-40 SYRINGE (ML) INJECTION 2 TIMES DAILY
Status: DISCONTINUED | OUTPATIENT
Start: 2024-02-14 | End: 2024-02-15 | Stop reason: HOSPADM

## 2024-02-14 RX ADMIN — KETOROLAC TROMETHAMINE 30 MG: 15 INJECTION, SOLUTION INTRAMUSCULAR; INTRAVENOUS at 21:49

## 2024-02-14 RX ADMIN — SODIUM CHLORIDE 1000 ML: 9 INJECTION, SOLUTION INTRAVENOUS at 21:48

## 2024-02-14 RX ADMIN — IOPAMIDOL 75 ML: 755 INJECTION, SOLUTION INTRAVENOUS at 22:50

## 2024-02-14 RX ADMIN — METOCLOPRAMIDE 10 MG: 5 INJECTION, SOLUTION INTRAMUSCULAR; INTRAVENOUS at 21:49

## 2024-02-14 RX ADMIN — DIPHENHYDRAMINE HYDROCHLORIDE 25 MG: 50 INJECTION, SOLUTION INTRAMUSCULAR; INTRAVENOUS at 21:49

## 2024-02-15 LAB
BACTERIA: NEGATIVE /HPF
CALCIUM OXALATE CRYSTALS: ABNORMAL /HPF
MUCUS: ABNORMAL HPF
RBC URINE: 3 /HPF (ref 0–6)
SQUAMOUS EPITHELIAL: <1 /HPF
TRICHOMONAS: ABNORMAL /HPF
WBC UA: <1 /HPF (ref 0–5)

## 2024-02-15 NOTE — ED PROVIDER NOTES
Triage Chief Complaint:   Headache and Abdominal Pain    [place]:  Hang Gee is a 28 y.o. female that presents with primary complaint of abdominal pain.  Patient reports abdominal pain started more generalized but is mostly been right upper and right lower quadrant throughout the day today.  Pain does radiate into the back at times.  Pain is currently moderate in intensity.  Some nausea with decreased oral intake.  No vomiting.  Some diarrhea.  No urinary symptoms.  Additionally, patient has been having headache that is currently mild but has been waxing and waning.    Of note, patient was with recent positive pregnancy test but was told that she was likely having a miscarriage by OB/GYN.  Patient is still with some vaginal bleeding.    No prior abdominal surgeries.    ROS:  General:  No fevers, no chills, no weakness  Eyes:  No recent vison changes, no discharge  ENT:  No sore throat, no nasal congestion, no hearing changes  Cardiovascular:  No chest pain, no palpitations  Respiratory:  No shortness of breath, no cough, no wheezing  Gastrointestinal:  + pain, + nausea, no vomiting, no diarrhea  Musculoskeletal:  No muscle pain, no joint pain  Skin:  No rash, no pruritis, no easy bruising  Neurologic:  No speech problems, + headache, no extremity numbness, no extremity tingling, no extremity weakness  Psychiatric:  No anxiety  Genitourinary:  No dysuria, no hematuria  Extremities:  no edema, no pain    Past Medical History:   Diagnosis Date    A-fib (HCC)     Fibroid     History of complete heart block     Irregular menses     Nonrheumatic mitral valve regurgitation      Past Surgical History:   Procedure Laterality Date    AORTIC VALVE REPLACEMENT  2018    bioprosthetic    CARDIAC SURGERY      PACEMAKER INSERTION      THERAPEUTIC       in [facility] 2020 around 18weeks due to heart disease     History reviewed. No pertinent family history.  Social History     Socioeconomic History    Marital status: Single

## 2024-02-16 LAB
CULTURE: NORMAL
Lab: NORMAL
SPECIMEN: NORMAL

## 2024-03-11 ENCOUNTER — OFFICE VISIT (OUTPATIENT)
Dept: OBGYN | Age: 29
End: 2024-03-11
Payer: COMMERCIAL

## 2024-03-11 VITALS
WEIGHT: 115 LBS | HEIGHT: 63 IN | BODY MASS INDEX: 20.38 KG/M2 | DIASTOLIC BLOOD PRESSURE: 86 MMHG | SYSTOLIC BLOOD PRESSURE: 135 MMHG

## 2024-03-11 DIAGNOSIS — N92.6 IRREGULAR MENSES: Primary | ICD-10-CM

## 2024-03-11 PROCEDURE — 99213 OFFICE O/P EST LOW 20 MIN: CPT | Performed by: OBSTETRICS & GYNECOLOGY

## 2024-03-11 SDOH — ECONOMIC STABILITY: FOOD INSECURITY: WITHIN THE PAST 12 MONTHS, THE FOOD YOU BOUGHT JUST DIDN'T LAST AND YOU DIDN'T HAVE MONEY TO GET MORE.: NEVER TRUE

## 2024-03-11 SDOH — ECONOMIC STABILITY: FOOD INSECURITY: WITHIN THE PAST 12 MONTHS, YOU WORRIED THAT YOUR FOOD WOULD RUN OUT BEFORE YOU GOT MONEY TO BUY MORE.: NEVER TRUE

## 2024-03-11 SDOH — ECONOMIC STABILITY: INCOME INSECURITY: HOW HARD IS IT FOR YOU TO PAY FOR THE VERY BASICS LIKE FOOD, HOUSING, MEDICAL CARE, AND HEATING?: NOT VERY HARD

## 2024-03-11 SDOH — ECONOMIC STABILITY: HOUSING INSECURITY
IN THE LAST 12 MONTHS, WAS THERE A TIME WHEN YOU DID NOT HAVE A STEADY PLACE TO SLEEP OR SLEPT IN A SHELTER (INCLUDING NOW)?: NO

## 2024-03-11 NOTE — PROGRESS NOTES
bleeding.  If age-appropriate she will monitor for fetal movement.  If she is having more than 4-6 contractions an hour she will present to labor and delivery.  She will continue taking her prenatal vitamins as prescribed.  All up-to-date prenatal labs and imaging were reviewed and discussed with patient.    Return for ultrasound, follow up appointment.    Lex George MD

## 2024-03-12 LAB — B-HCG SERPL EIA 3RD IS-ACNC: <5 MIU/ML

## 2024-11-15 ENCOUNTER — TELEPHONE (OUTPATIENT)
Dept: CARDIOLOGY CLINIC | Age: 29
End: 2024-11-15

## 2024-11-15 NOTE — TELEPHONE ENCOUNTER
Called patient as she has not had her pacemaker checked in 2 years. Patient states that she has transferred everything to a cardiologist in [place].